# Patient Record
Sex: MALE | Race: WHITE | NOT HISPANIC OR LATINO
[De-identification: names, ages, dates, MRNs, and addresses within clinical notes are randomized per-mention and may not be internally consistent; named-entity substitution may affect disease eponyms.]

---

## 2017-12-27 PROBLEM — Z00.00 ENCOUNTER FOR PREVENTIVE HEALTH EXAMINATION: Status: ACTIVE | Noted: 2017-12-27

## 2018-01-26 ENCOUNTER — APPOINTMENT (OUTPATIENT)
Dept: NEUROLOGY | Facility: CLINIC | Age: 63
End: 2018-01-26
Payer: COMMERCIAL

## 2018-01-26 VITALS
SYSTOLIC BLOOD PRESSURE: 120 MMHG | DIASTOLIC BLOOD PRESSURE: 82 MMHG | WEIGHT: 200 LBS | HEIGHT: 70 IN | BODY MASS INDEX: 28.63 KG/M2

## 2018-01-26 DIAGNOSIS — E78.5 HYPERLIPIDEMIA, UNSPECIFIED: ICD-10-CM

## 2018-01-26 DIAGNOSIS — Z78.9 OTHER SPECIFIED HEALTH STATUS: ICD-10-CM

## 2018-01-26 PROCEDURE — 99213 OFFICE O/P EST LOW 20 MIN: CPT

## 2018-09-14 ENCOUNTER — EMERGENCY (EMERGENCY)
Facility: HOSPITAL | Age: 63
LOS: 1 days | Discharge: HOME | End: 2018-09-14
Attending: EMERGENCY MEDICINE
Payer: COMMERCIAL

## 2018-09-14 VITALS
SYSTOLIC BLOOD PRESSURE: 132 MMHG | TEMPERATURE: 98 F | OXYGEN SATURATION: 98 % | RESPIRATION RATE: 16 BRPM | DIASTOLIC BLOOD PRESSURE: 65 MMHG | HEART RATE: 87 BPM

## 2018-09-14 VITALS — HEIGHT: 67 IN | WEIGHT: 199.96 LBS

## 2018-09-14 LAB
ALBUMIN SERPL ELPH-MCNC: 4.1 G/DL — SIGNIFICANT CHANGE UP (ref 3.3–5.2)
ALP SERPL-CCNC: 52 U/L — SIGNIFICANT CHANGE UP (ref 40–120)
ALT FLD-CCNC: 21 U/L — SIGNIFICANT CHANGE UP
ANION GAP SERPL CALC-SCNC: 12 MMOL/L — SIGNIFICANT CHANGE UP (ref 5–17)
APPEARANCE UR: CLEAR — SIGNIFICANT CHANGE UP
AST SERPL-CCNC: 31 U/L — SIGNIFICANT CHANGE UP
BACTERIA # UR AUTO: ABNORMAL
BASOPHILS NFR BLD AUTO: 1 % — SIGNIFICANT CHANGE UP (ref 0–2)
BILIRUB SERPL-MCNC: 0.4 MG/DL — SIGNIFICANT CHANGE UP (ref 0.4–2)
BILIRUB UR-MCNC: NEGATIVE — SIGNIFICANT CHANGE UP
BUN SERPL-MCNC: 11 MG/DL — SIGNIFICANT CHANGE UP (ref 8–20)
CALCIUM SERPL-MCNC: 8.8 MG/DL — SIGNIFICANT CHANGE UP (ref 8.6–10.2)
CHLORIDE SERPL-SCNC: 102 MMOL/L — SIGNIFICANT CHANGE UP (ref 98–107)
CO2 SERPL-SCNC: 25 MMOL/L — SIGNIFICANT CHANGE UP (ref 22–29)
COLOR SPEC: YELLOW — SIGNIFICANT CHANGE UP
CREAT SERPL-MCNC: 0.73 MG/DL — SIGNIFICANT CHANGE UP (ref 0.5–1.3)
DIFF PNL FLD: ABNORMAL
EOSINOPHIL NFR BLD AUTO: 1 % — SIGNIFICANT CHANGE UP (ref 0–6)
EPI CELLS # UR: SIGNIFICANT CHANGE UP
GLUCOSE SERPL-MCNC: 135 MG/DL — HIGH (ref 70–115)
GLUCOSE UR QL: NEGATIVE MG/DL — SIGNIFICANT CHANGE UP
HCT VFR BLD CALC: 47.4 % — SIGNIFICANT CHANGE UP (ref 42–52)
HGB BLD-MCNC: 15.5 G/DL — SIGNIFICANT CHANGE UP (ref 14–18)
KETONES UR-MCNC: NEGATIVE — SIGNIFICANT CHANGE UP
LACTATE BLDV-MCNC: 1 MMOL/L — SIGNIFICANT CHANGE UP (ref 0.5–2)
LEUKOCYTE ESTERASE UR-ACNC: NEGATIVE — SIGNIFICANT CHANGE UP
LYMPHOCYTES # BLD AUTO: 12 % — LOW (ref 20–55)
MCHC RBC-ENTMCNC: 31.4 PG — HIGH (ref 27–31)
MCHC RBC-ENTMCNC: 32.7 G/DL — SIGNIFICANT CHANGE UP (ref 32–36)
MCV RBC AUTO: 96.1 FL — HIGH (ref 80–94)
MONOCYTES NFR BLD AUTO: 14 % — HIGH (ref 3–10)
NEUTROPHILS NFR BLD AUTO: 67 % — SIGNIFICANT CHANGE UP (ref 37–73)
NEUTS BAND # BLD: 4 % — SIGNIFICANT CHANGE UP (ref 0–8)
NITRITE UR-MCNC: NEGATIVE — SIGNIFICANT CHANGE UP
PH UR: 6 — SIGNIFICANT CHANGE UP (ref 5–8)
PLAT MORPH BLD: NORMAL — SIGNIFICANT CHANGE UP
PLATELET # BLD AUTO: 241 K/UL — SIGNIFICANT CHANGE UP (ref 150–400)
POTASSIUM SERPL-MCNC: 3.8 MMOL/L — SIGNIFICANT CHANGE UP (ref 3.5–5.3)
POTASSIUM SERPL-SCNC: 3.8 MMOL/L — SIGNIFICANT CHANGE UP (ref 3.5–5.3)
PROT SERPL-MCNC: 6.8 G/DL — SIGNIFICANT CHANGE UP (ref 6.6–8.7)
PROT UR-MCNC: 15 MG/DL
RAPID RVP RESULT: SIGNIFICANT CHANGE UP
RBC # BLD: 4.93 M/UL — SIGNIFICANT CHANGE UP (ref 4.6–6.2)
RBC # FLD: 13.1 % — SIGNIFICANT CHANGE UP (ref 11–15.6)
RBC BLD AUTO: NORMAL — SIGNIFICANT CHANGE UP
RBC CASTS # UR COMP ASSIST: ABNORMAL /HPF (ref 0–4)
SODIUM SERPL-SCNC: 139 MMOL/L — SIGNIFICANT CHANGE UP (ref 135–145)
SP GR SPEC: 1.01 — SIGNIFICANT CHANGE UP (ref 1.01–1.02)
UROBILINOGEN FLD QL: NEGATIVE MG/DL — SIGNIFICANT CHANGE UP
VARIANT LYMPHS # BLD: 1 % — SIGNIFICANT CHANGE UP (ref 0–6)
WBC # BLD: 6.4 K/UL — SIGNIFICANT CHANGE UP (ref 4.8–10.8)
WBC # FLD AUTO: 6.4 K/UL — SIGNIFICANT CHANGE UP (ref 4.8–10.8)
WBC UR QL: SIGNIFICANT CHANGE UP

## 2018-09-14 PROCEDURE — 36415 COLL VENOUS BLD VENIPUNCTURE: CPT

## 2018-09-14 PROCEDURE — 83605 ASSAY OF LACTIC ACID: CPT

## 2018-09-14 PROCEDURE — 71046 X-RAY EXAM CHEST 2 VIEWS: CPT

## 2018-09-14 PROCEDURE — 71046 X-RAY EXAM CHEST 2 VIEWS: CPT | Mod: 26,77

## 2018-09-14 PROCEDURE — 96375 TX/PRO/DX INJ NEW DRUG ADDON: CPT

## 2018-09-14 PROCEDURE — 87581 M.PNEUMON DNA AMP PROBE: CPT

## 2018-09-14 PROCEDURE — 81001 URINALYSIS AUTO W/SCOPE: CPT

## 2018-09-14 PROCEDURE — 87633 RESP VIRUS 12-25 TARGETS: CPT

## 2018-09-14 PROCEDURE — 99284 EMERGENCY DEPT VISIT MOD MDM: CPT | Mod: 25

## 2018-09-14 PROCEDURE — 87798 DETECT AGENT NOS DNA AMP: CPT

## 2018-09-14 PROCEDURE — 96374 THER/PROPH/DIAG INJ IV PUSH: CPT

## 2018-09-14 PROCEDURE — 87086 URINE CULTURE/COLONY COUNT: CPT

## 2018-09-14 PROCEDURE — 99284 EMERGENCY DEPT VISIT MOD MDM: CPT

## 2018-09-14 PROCEDURE — 85027 COMPLETE CBC AUTOMATED: CPT

## 2018-09-14 PROCEDURE — 87040 BLOOD CULTURE FOR BACTERIA: CPT

## 2018-09-14 PROCEDURE — 87486 CHLMYD PNEUM DNA AMP PROBE: CPT

## 2018-09-14 PROCEDURE — 80053 COMPREHEN METABOLIC PANEL: CPT

## 2018-09-14 RX ORDER — VALACYCLOVIR 500 MG/1
1 TABLET, FILM COATED ORAL
Qty: 0 | Refills: 0 | DISCHARGE
Start: 2018-09-14 | End: 2018-09-23

## 2018-09-14 RX ORDER — AZITHROMYCIN 500 MG/1
500 TABLET, FILM COATED ORAL ONCE
Qty: 0 | Refills: 0 | Status: COMPLETED | OUTPATIENT
Start: 2018-09-14 | End: 2018-09-14

## 2018-09-14 RX ORDER — AZITHROMYCIN 500 MG/1
1 TABLET, FILM COATED ORAL
Qty: 4 | Refills: 0
Start: 2018-09-14 | End: 2018-09-17

## 2018-09-14 RX ORDER — CEFTRIAXONE 500 MG/1
1 INJECTION, POWDER, FOR SOLUTION INTRAMUSCULAR; INTRAVENOUS ONCE
Qty: 0 | Refills: 0 | Status: COMPLETED | OUTPATIENT
Start: 2018-09-14 | End: 2018-09-14

## 2018-09-14 RX ORDER — SODIUM CHLORIDE 9 MG/ML
1000 INJECTION INTRAMUSCULAR; INTRAVENOUS; SUBCUTANEOUS ONCE
Qty: 0 | Refills: 0 | Status: COMPLETED | OUTPATIENT
Start: 2018-09-14 | End: 2018-09-14

## 2018-09-14 RX ORDER — ACETAMINOPHEN 500 MG
650 TABLET ORAL ONCE
Qty: 0 | Refills: 0 | Status: COMPLETED | OUTPATIENT
Start: 2018-09-14 | End: 2018-09-14

## 2018-09-14 RX ADMIN — CEFTRIAXONE 100 GRAM(S): 500 INJECTION, POWDER, FOR SOLUTION INTRAMUSCULAR; INTRAVENOUS at 18:38

## 2018-09-14 RX ADMIN — SODIUM CHLORIDE 2000 MILLILITER(S): 9 INJECTION INTRAMUSCULAR; INTRAVENOUS; SUBCUTANEOUS at 18:36

## 2018-09-14 RX ADMIN — Medication 650 MILLIGRAM(S): at 19:08

## 2018-09-14 RX ADMIN — Medication 650 MILLIGRAM(S): at 18:40

## 2018-09-14 RX ADMIN — AZITHROMYCIN 255 MILLIGRAM(S): 500 TABLET, FILM COATED ORAL at 19:08

## 2018-09-14 NOTE — ED PROVIDER NOTE - OBJECTIVE STATEMENT
Patient is a 63 y/o male who states that he was sent to the ED by his dermatologist. He first noticed a bilateral red papular rash on his legs, belly and groin that has increased since last sunday. The lesions are non painful slightly itchy and slightly "burning" in sensation (5 days ago)last night stated he had a fever to 102.3. Today he states he was seen by his dermatologist who took a biopsy of one of the lesions and also received blood work and an xray. He was later called and told his xray had an abnormal result and to go to the ED. He denies any nausea, vomiting, SOB, exertional dyspnea, productive cough, chest pain, dysuria. He did have some exposure to the wilderness recently and works as a highschool teacher. No known sick contacts or international travel Patient is a 61 y/o male who states that he was sent to the ED by his dermatologist. He first noticed a bilateral red papular rash on his legs, belly and groin that has increased since last sunday. The lesions are non painful slightly itchy and slightly "burning" in sensation (5 days ago)last night stated he had a fever to 102.3. Today he states he was seen by his dermatologist who took a biopsy of one of the lesions and also received blood work and an xray. He was later called and told his xray had an abnormal result and to go to the ED. Pt's dermatologist was concerned about possible primary varicella with concomitant pneumonia.  He denies any nausea, vomiting, SOB, exertional dyspnea, productive cough, chest pain, dysuria. He did have some exposure to the wilderness recently and works as a . No known sick contacts or international travel

## 2018-09-14 NOTE — ED PROVIDER NOTE - PROGRESS NOTE DETAILS
Pt. stable appearing. NO SOB. NO chest pain. NO coughing. O2 95-96% on RA. x-ray reviewed with night radiologist. She stated that she did not appreciate any pneumonia. Awaiting on RVP. Pt. will be discharged home on PO ABX and Pt. may continue to take his Acyclovir. Pt. with negative. RVP. Pt. stable for discharge. Pt. informed that he should follow up with his PMD and or dermatologist. Pt. is to return to ER if persistent fever or difficulty breathing.

## 2018-09-14 NOTE — ED ADULT TRIAGE NOTE - CHIEF COMPLAINT QUOTE
sent from [facility]md for r/o chicken pox, as per chief of cdc , pt has vesicles, and wants fluids from lesions kept on ice and sent to [facility] lab for eval. , as per cdc pt needs a resp. viral panel

## 2018-09-14 NOTE — ED ADULT NURSE NOTE - OBJECTIVE STATEMENT
Assumed care at 1832.  A+OX4, code sepsis initiated from [facility] for temp of 101.3 and o2 sat 94%.  Generalized rash through out body, denies itching started on Sunday. Denies recent travel. Sent here from pmd. Assumed care at 1832.  A+OX4, code sepsis initiated from [facility] for temp of 101.3 and HR 91.  Generalized rash through out body, denies itching started on Sunday. Denies recent travel. Sent here from pmd.

## 2018-09-14 NOTE — ED ADULT NURSE NOTE - CHIEF COMPLAINT QUOTE
sent from [facility] for r/o chicken pox, as per chief of cdc , pt has vesicles, and wants fluids from lesions kept on ice and sent to [facility] lab for eval. , as per cdc pt needs a resp. viral panel

## 2018-09-14 NOTE — ED PROVIDER NOTE - SKIN LOCATION #1
1-2 mm raised red papular lesions, no crusting, no fluid, no vesicles/back/groin/abdominal region/leg

## 2018-09-14 NOTE — ED ADULT NURSE NOTE - NSIMPLEMENTINTERV_GEN_ALL_ED
Implemented All Universal Safety Interventions:  [place] to call system. Call bell, personal items and telephone within reach. Instruct patient to call for assistance. Room bathroom lighting operational. Non-slip footwear when patient is off stretcher. Physically safe environment: no spills, clutter or unnecessary equipment. Stretcher in lowest position, wheels locked, appropriate side rails in place.

## 2018-09-15 LAB
CULTURE RESULTS: NO GROWTH — SIGNIFICANT CHANGE UP
SPECIMEN SOURCE: SIGNIFICANT CHANGE UP

## 2018-09-15 RX ORDER — ROSUVASTATIN CALCIUM 5 MG/1
1 TABLET ORAL
Qty: 0 | Refills: 0 | COMMUNITY

## 2018-09-19 LAB
CULTURE RESULTS: SIGNIFICANT CHANGE UP
CULTURE RESULTS: SIGNIFICANT CHANGE UP
SPECIMEN SOURCE: SIGNIFICANT CHANGE UP
SPECIMEN SOURCE: SIGNIFICANT CHANGE UP

## 2019-01-29 ENCOUNTER — APPOINTMENT (OUTPATIENT)
Dept: NEUROLOGY | Facility: CLINIC | Age: 64
End: 2019-01-29
Payer: COMMERCIAL

## 2019-01-29 VITALS
HEIGHT: 70 IN | WEIGHT: 200 LBS | DIASTOLIC BLOOD PRESSURE: 70 MMHG | SYSTOLIC BLOOD PRESSURE: 130 MMHG | BODY MASS INDEX: 28.63 KG/M2

## 2019-01-29 DIAGNOSIS — G31.84 MILD COGNITIVE IMPAIRMENT, SO STATED: ICD-10-CM

## 2019-01-29 PROCEDURE — 99214 OFFICE O/P EST MOD 30 MIN: CPT

## 2019-01-29 NOTE — CONSULT LETTER
[Dear  ___] : Dear  [unfilled], [Courtesy Letter:] : I had the pleasure of seeing your patient, [unfilled], in my office today. [Please see my note below.] : Please see my note below. [Consult Closing:] : Thank you very much for allowing me to participate in the care of this patient.  If you have any questions, please do not hesitate to contact me. [Sincerely,] : Sincerely, [FreeTextEntry3] : Tee Danielson M.D., Ph.D. DPN-N\par [facility]\par Neurology at [place]\par Medical Director of Stroke Services\par [facility]\par

## 2019-01-29 NOTE — REASON FOR VISIT
[Follow-Up: _____] : a [unfilled] follow-up visit [FreeTextEntry1] : new c/o memory probolems and left leg numbness

## 2019-01-29 NOTE — HISTORY OF PRESENT ILLNESS
[FreeTextEntry1] : Followup January 26, 2018:\par This is a 62-year-old man who follows up today for history of seizure. In the past we have tapered his Keppra. He had been doing well but had one event this questionable seizure. We repeated an EEG which was normal. He states that he had another event in July but this was in the setting of extreme stress oriented by 3 hours of confusion. His life of late has been extremely stressful for both personal and work-related reasons. He seems to be dealing with the stress fairly well. Occasionally he may have some confusion from being overwhelmed. He is here today for neurologic followup evaluation.\par \par Followup January 29, 2019:\par This is a 63-year-old man who returns today for followup of seizures as well as anxiety. He additionally has new complaints of memory loss and left leg numbness. Regarding his seizures she's been seizure free for over a year and a half now he's been off his Keppra. His last seizure may not have been epileptic and that is in the setting of a tremendous amount of stress. Regarding his anxiety he does have anxiety of hearing levels. He does see a  for this. His ulcer reported memory problems and absent mindedness at times. Which may be due to his anxiety but it's not clear. He is concerned about the diagnosis of Alzheimer's type dementia. He's also been experiencing numbness down the lateral portion of the left leg that starts in the hip and radiates down to the foot. He'll occasionally also have pain in the Achilles tendon upon stretching of the foot. He is here today for neurologic evaluation of these issues.

## 2019-01-29 NOTE — ASSESSMENT
[FreeTextEntry1] : This is a gentleman with previous history of seizures currently stable off medication. He has anxiety for which she sees a  however it seems that his anxiety has increased. I did make a suggestion that he consider seeing psychiatrist if he needs medication. Regarding his memory problems I think they're likely due to his anxiety but just to confirm that is not do to more of a degenerative process I will send her for neuropsychologic testing. He may be able to continue treatment with the psychologist which may help his anxiety as well. Regarding the numbness in the leg that sounds like a radiculopathy and to confirm this I will do an EMG and nerve conduction study of his left lower extremity. I will see him back in the office in 2 months or call him once again the results of his testing.

## 2019-01-29 NOTE — PHYSICAL EXAM
[Person] : oriented to person [Place] : oriented to place [Time] : oriented to time [Remote Intact] : remote memory intact [Registration Intact] : recent registration memory intact [Span Intact] : the attention span was normal [Concentration Intact] : normal concentrating ability [Visual Intact] : visual attention was ~T not ~L decreased [Naming Objects] : no difficulty naming common objects [Repeating Phrases] : no difficulty repeating a phrase [Fluency] : fluency intact [Comprehension] : comprehension intact [Current Events] : adequate knowledge of current events [Past History] : adequate knowledge of personal past history [Cranial Nerves Optic (II)] : visual acuity intact bilaterally,  visual fields full to confrontation, pupils equal round and reactive to light [Cranial Nerves Oculomotor (III)] : extraocular motion intact [Cranial Nerves Trigeminal (V)] : facial sensation intact symmetrically [Cranial Nerves Facial (VII)] : face symmetrical [Cranial Nerves Vestibulocochlear (VIII)] : hearing was intact bilaterally [Cranial Nerves Glossopharyngeal (IX)] : tongue and palate midline [Cranial Nerves Accessory (XI - Cranial And Spinal)] : head turning and shoulder shrug symmetric [Cranial Nerves Hypoglossal (XII)] : there was no tongue deviation with protrusion [Motor Tone] : muscle tone was normal in all four extremities [Motor Strength] : muscle strength was normal in all four extremities [Involuntary Movements] : no involuntary movements were seen [No Muscle Atrophy] : normal bulk in all four extremities [Paresis Pronator Drift Right-Sided] : no pronator drift on the right [Paresis Pronator Drift Left-Sided] : no pronator drift on the left [Sensation Tactile Decrease] : light touch was intact [Sensation Pain / Temperature Decrease] : pain and temperature was intact [Sensation Vibration Decrease] : vibration was intact [Proprioception] : proprioception was intact [Abnormal Walk] : normal gait [Balance] : balance was intact [Tremor] : no tremor present [Coordination - Dysmetria Impaired Finger-to-Nose Bilateral] : not present [2+] : Patella right 2+ [1+] : Patella left 1+ [Sclera] : the sclera and conjunctiva were normal [PERRL With Normal Accommodation] : pupils were equal in size, round, reactive to light, with normal accommodation [Extraocular Movements] : extraocular movements were intact [No APD] : no afferent pupillary defect [No MILENA] : no internuclear ophthalmoplegia [Full Visual Field] : full visual field

## 2019-01-29 NOTE — REVIEW OF SYSTEMS
[Anxiety] : anxiety [As Noted in HPI] : as noted in HPI [Confused or Disoriented] : confusion [Memory Lapses or Loss] : memory loss [Decr. Concentrating Ability] : decreased concentrating ability [Numbness] : numbness [Negative] : Heme/Lymph

## 2019-02-15 ENCOUNTER — TRANSCRIPTION ENCOUNTER (OUTPATIENT)
Age: 64
End: 2019-02-15

## 2019-03-07 ENCOUNTER — APPOINTMENT (OUTPATIENT)
Dept: NEUROLOGY | Facility: CLINIC | Age: 64
End: 2019-03-07
Payer: COMMERCIAL

## 2019-03-07 PROCEDURE — 95910 NRV CNDJ TEST 7-8 STUDIES: CPT

## 2019-03-07 PROCEDURE — 95886 MUSC TEST DONE W/N TEST COMP: CPT

## 2019-04-09 ENCOUNTER — APPOINTMENT (OUTPATIENT)
Dept: NEUROLOGY | Facility: CLINIC | Age: 64
End: 2019-04-09

## 2020-03-19 ENCOUNTER — APPOINTMENT (OUTPATIENT)
Dept: DERMATOLOGY | Facility: CLINIC | Age: 65
End: 2020-03-19
Payer: COMMERCIAL

## 2020-03-19 PROCEDURE — 99202 OFFICE O/P NEW SF 15 MIN: CPT

## 2020-08-27 ENCOUNTER — APPOINTMENT (OUTPATIENT)
Dept: ORTHOPEDIC SURGERY | Facility: CLINIC | Age: 65
End: 2020-08-27
Payer: COMMERCIAL

## 2020-08-27 VITALS
DIASTOLIC BLOOD PRESSURE: 75 MMHG | HEART RATE: 71 BPM | BODY MASS INDEX: 28.63 KG/M2 | WEIGHT: 200 LBS | SYSTOLIC BLOOD PRESSURE: 133 MMHG | HEIGHT: 70 IN

## 2020-08-27 PROCEDURE — 99203 OFFICE O/P NEW LOW 30 MIN: CPT

## 2020-08-27 PROCEDURE — 73560 X-RAY EXAM OF KNEE 1 OR 2: CPT | Mod: LT

## 2020-08-31 NOTE — ADDENDUM
[FreeTextEntry1] : Dictated by Kady Murrieta, OTR/L, PA \par \par This note was written by Zuleyka Stahl on 08/30/2020 acting as scribe for You Baker III, MD

## 2020-08-31 NOTE — DISCUSSION/SUMMARY
[de-identified] : The patient is advised to put a neoprene sleeve onto the left knee for the left knee bursitis.  He was placed into an Ace bandage today for compression.  He is advised to continue to use anti-inflammatories and ice it consistently.  If it gets red, he is to make sure he calls the office right away.  He will return back to the office in one week to make sure it is getting much better.

## 2020-08-31 NOTE — HISTORY OF PRESENT ILLNESS
[de-identified] : The patient comes in today stating that he was away and he banged his left knee, as well as kneeling on it, and then he fell on it.  He now has a swollen knee below his kneecap.  He states that it is very swollen.  He does not have any major pain today.  The patient states the onset/injury occurred on 20.  This injury is not work related or due to an automobile accident.  The patient states the pain is constant.  The patient describes the pain as dull and burning.  The patient states rest, water therapy and ibuprofen makes the pain better while walking and standing makes the pain worse.\par \par Pain level includes a current pain level of 3/10.\par \par Ailment Interference:  \par Daily Livin/10\par Normal Work:  5/10\par Sleep:  4/10\par Enjoyment of Life:  4/10\par Climbing Stairs:  8/10\par Extra-Curricular Activities:  5/10

## 2020-08-31 NOTE — REVIEW OF SYSTEMS
[Negative] : Heme/Lymph [FreeTextEntry3] : Redness [FreeTextEntry9] : Joint stiffness; joint swelling [de-identified] : Anxiety; depression; sleep disturbances

## 2020-09-02 ENCOUNTER — APPOINTMENT (OUTPATIENT)
Dept: ORTHOPEDIC SURGERY | Facility: CLINIC | Age: 65
End: 2020-09-02
Payer: COMMERCIAL

## 2020-09-02 VITALS
WEIGHT: 200 LBS | HEIGHT: 70 IN | DIASTOLIC BLOOD PRESSURE: 69 MMHG | BODY MASS INDEX: 28.63 KG/M2 | SYSTOLIC BLOOD PRESSURE: 102 MMHG | HEART RATE: 65 BPM

## 2020-09-02 PROCEDURE — 99213 OFFICE O/P EST LOW 20 MIN: CPT

## 2020-09-04 NOTE — PHYSICAL EXAM
[de-identified] : Ambulating with a slightly antalgic to antalgic gait.  Station:  Normal.  [de-identified] : General Appearance:  Well-developed, well-nourished male in no acute distress.  [de-identified] : Left Knee: \par Range of motion is within normal limits. \par

## 2020-09-04 NOTE — HISTORY OF PRESENT ILLNESS
[de-identified] : The patient comes in today for the left knee.  The patient is still having some redness and swelling to the area.  There is no mechanical issues.  He states that he is feeling a little bit tired.  His hip is also hurting.

## 2020-09-04 NOTE — DISCUSSION/SUMMARY
[de-identified] : I am ordering some Lyme titers and some general blood work due to the left knee swelling, knee joint effusion and bursitis.  He is advised to follow up with his primary care physician today or tomorrow.  We will do a telephonics with him in two weeks.

## 2020-09-04 NOTE — ADDENDUM
[FreeTextEntry1] : This note was written by Zuleyka Stahl on 09/04/2020 acting as scribe for Kady Murrieta, CHIOMA/L, PA

## 2020-09-16 ENCOUNTER — APPOINTMENT (OUTPATIENT)
Dept: ORTHOPEDIC SURGERY | Facility: CLINIC | Age: 65
End: 2020-09-16
Payer: COMMERCIAL

## 2020-09-16 DIAGNOSIS — M25.469 EFFUSION, UNSPECIFIED KNEE: ICD-10-CM

## 2020-09-16 DIAGNOSIS — M70.52 OTHER BURSITIS OF KNEE, LEFT KNEE: ICD-10-CM

## 2020-09-16 PROCEDURE — 99441: CPT

## 2020-10-06 ENCOUNTER — TRANSCRIPTION ENCOUNTER (OUTPATIENT)
Age: 65
End: 2020-10-06

## 2020-10-16 ENCOUNTER — APPOINTMENT (OUTPATIENT)
Dept: ORTHOPEDIC SURGERY | Facility: CLINIC | Age: 65
End: 2020-10-16
Payer: COMMERCIAL

## 2020-10-16 VITALS
DIASTOLIC BLOOD PRESSURE: 77 MMHG | HEART RATE: 80 BPM | SYSTOLIC BLOOD PRESSURE: 130 MMHG | WEIGHT: 200 LBS | BODY MASS INDEX: 28.63 KG/M2 | HEIGHT: 70 IN

## 2020-10-16 DIAGNOSIS — Z86.39 PERSONAL HISTORY OF OTHER ENDOCRINE, NUTRITIONAL AND METABOLIC DISEASE: ICD-10-CM

## 2020-10-16 PROCEDURE — 99214 OFFICE O/P EST MOD 30 MIN: CPT

## 2020-10-16 PROCEDURE — 72100 X-RAY EXAM L-S SPINE 2/3 VWS: CPT

## 2020-10-16 NOTE — ADDENDUM
[FreeTextEntry1] : Documented by Erik Feliciano acting as a scribe for Dr. Reggie Henning on 10/16/2020. All medical record entries made by the Scribe were at my, Dr. Reggie Heninng, direction and personally dictated by me on 10/16/2020 . I have reviewed the chart and agree that the record accurately reflects my personal performance of the history, physical exam, assessment and plan. I have also personally directed, reviewed, and agreed with the chart.

## 2020-10-16 NOTE — PHYSICAL EXAM
[Poor Appearance] : well-appearing [Acute Distress] : not in acute distress [de-identified] : CONSTITUTIONAL: The patient is a very pleasant individual who is well-nourished and who appears stated age.\par PSYCHIATRIC: The patient is alert and oriented X 3 and in no apparent distress, and participates with orthopedic evaluation well.\par HEAD: Atraumatic and is nonsyndromic in appearance.\par EENT: No visible thyromegaly, EOMI.\par RESPIRATORY: Respiratory rate is regular, not dyspneic on examination.\par LYMPHATICS: There is no inguinal lymphadenopathy\par INTEGUMENTARY: Skin is clean, dry, and intact about the bilateral lower extremities and lumbar spine.\par VASCULAR: There is brisk capillary refill about the bilateral lower extremities.\par NEUROLOGIC: There are no pathologic reflexes. There is no decrease in sensation of the bilateral lower extremities on Wartenberg pinwheel examination. Deep tendon reflexes are well maintained at 2+/4 of the bilateral lower extremities and are symmetric.\par MUSCULOSKELETAL: There is no visible muscular atrophy. Manual motor strength is well maintained in the bilateral lower extremities. Range of motion of lumbar spine is well maintained. The patient ambulates in a non-myelopathic manner. Negative tension sign and straight leg raise bilaterally. Quad extension, ankle dorsiflexion, EHL, plantar flexion, and ankle eversion are well preserved. Normal secondary orthopaedic exam of bilateral hips, greater trochanteric area, knees and ankles \par \par Relatively asymptomatic at this time, mild mechanically oriented lower back pain, no radiculopathy complaints.  [Obese] : not obese [de-identified] : Xray of the lumbar spine taken 10/16/2020 demonstrates degenerative Rotoscoliosis and disc space asymmetry at L4 and L5.

## 2020-10-16 NOTE — HISTORY OF PRESENT ILLNESS
[Ataxia] : no ataxia [de-identified] : 64 year old  M Presents for evaluation of chronic intermittent back pain that is at a 1/10 currently. The episodes are spasm like which I think may be consistent with a anular tear.  In 08/2020 he got what he calls a back seizure that causes him to be unable to function. He describes the pain as burning and electric in feeling that was so intense he can only walk roughly thirty feet at a time. He went to an urgent care and got a muscle relaxant, the pain subsided two days later.  He hasn’t had one in ten years. No radiculopathy complaints.  [Incontinence] : no incontinence [Loss of Dexterity] : good dexterity [Urinary Ret.] : no urinary retention

## 2020-10-22 ENCOUNTER — EMERGENCY (EMERGENCY)
Facility: HOSPITAL | Age: 65
LOS: 1 days | Discharge: HOME | End: 2020-10-22
Attending: EMERGENCY MEDICINE
Payer: COMMERCIAL

## 2020-10-22 VITALS
HEART RATE: 75 BPM | RESPIRATION RATE: 18 BRPM | OXYGEN SATURATION: 96 % | DIASTOLIC BLOOD PRESSURE: 74 MMHG | TEMPERATURE: 98 F | SYSTOLIC BLOOD PRESSURE: 136 MMHG

## 2020-10-22 VITALS
DIASTOLIC BLOOD PRESSURE: 73 MMHG | HEART RATE: 82 BPM | SYSTOLIC BLOOD PRESSURE: 136 MMHG | WEIGHT: 199.96 LBS | TEMPERATURE: 100 F | RESPIRATION RATE: 18 BRPM | HEIGHT: 70 IN | OXYGEN SATURATION: 96 %

## 2020-10-22 LAB
ALBUMIN SERPL ELPH-MCNC: 4.5 G/DL — SIGNIFICANT CHANGE UP (ref 3.3–5.2)
ALP SERPL-CCNC: 63 U/L — SIGNIFICANT CHANGE UP (ref 40–120)
ALT FLD-CCNC: 20 U/L — SIGNIFICANT CHANGE UP
ANION GAP SERPL CALC-SCNC: 14 MMOL/L — SIGNIFICANT CHANGE UP (ref 5–17)
APTT BLD: 30.1 SEC — SIGNIFICANT CHANGE UP (ref 27.5–35.5)
AST SERPL-CCNC: 21 U/L — SIGNIFICANT CHANGE UP
BASOPHILS # BLD AUTO: 0.06 K/UL — SIGNIFICANT CHANGE UP (ref 0–0.2)
BASOPHILS NFR BLD AUTO: 0.4 % — SIGNIFICANT CHANGE UP (ref 0–2)
BILIRUB SERPL-MCNC: 0.5 MG/DL — SIGNIFICANT CHANGE UP (ref 0.4–2)
BUN SERPL-MCNC: 10 MG/DL — SIGNIFICANT CHANGE UP (ref 8–20)
CALCIUM SERPL-MCNC: 9.3 MG/DL — SIGNIFICANT CHANGE UP (ref 8.6–10.2)
CHLORIDE SERPL-SCNC: 98 MMOL/L — SIGNIFICANT CHANGE UP (ref 98–107)
CO2 SERPL-SCNC: 26 MMOL/L — SIGNIFICANT CHANGE UP (ref 22–29)
CREAT SERPL-MCNC: 0.66 MG/DL — SIGNIFICANT CHANGE UP (ref 0.5–1.3)
EOSINOPHIL # BLD AUTO: 0.01 K/UL — SIGNIFICANT CHANGE UP (ref 0–0.5)
EOSINOPHIL NFR BLD AUTO: 0.1 % — SIGNIFICANT CHANGE UP (ref 0–6)
GLUCOSE SERPL-MCNC: 159 MG/DL — HIGH (ref 70–99)
HCT VFR BLD CALC: 48.7 % — SIGNIFICANT CHANGE UP (ref 39–50)
HGB BLD-MCNC: 16.3 G/DL — SIGNIFICANT CHANGE UP (ref 13–17)
IMM GRANULOCYTES NFR BLD AUTO: 0.7 % — SIGNIFICANT CHANGE UP (ref 0–1.5)
INR BLD: 1.04 RATIO — SIGNIFICANT CHANGE UP (ref 0.88–1.16)
LYMPHOCYTES # BLD AUTO: 1.86 K/UL — SIGNIFICANT CHANGE UP (ref 1–3.3)
LYMPHOCYTES # BLD AUTO: 13.7 % — SIGNIFICANT CHANGE UP (ref 13–44)
MCHC RBC-ENTMCNC: 31.8 PG — SIGNIFICANT CHANGE UP (ref 27–34)
MCHC RBC-ENTMCNC: 33.5 GM/DL — SIGNIFICANT CHANGE UP (ref 32–36)
MCV RBC AUTO: 95.1 FL — SIGNIFICANT CHANGE UP (ref 80–100)
MONOCYTES # BLD AUTO: 1.03 K/UL — HIGH (ref 0–0.9)
MONOCYTES NFR BLD AUTO: 7.6 % — SIGNIFICANT CHANGE UP (ref 2–14)
NEUTROPHILS # BLD AUTO: 10.49 K/UL — HIGH (ref 1.8–7.4)
NEUTROPHILS NFR BLD AUTO: 77.5 % — HIGH (ref 43–77)
PLATELET # BLD AUTO: 345 K/UL — SIGNIFICANT CHANGE UP (ref 150–400)
POTASSIUM SERPL-MCNC: 3.8 MMOL/L — SIGNIFICANT CHANGE UP (ref 3.5–5.3)
POTASSIUM SERPL-SCNC: 3.8 MMOL/L — SIGNIFICANT CHANGE UP (ref 3.5–5.3)
PROT SERPL-MCNC: 7 G/DL — SIGNIFICANT CHANGE UP (ref 6.6–8.7)
PROTHROM AB SERPL-ACNC: 12 SEC — SIGNIFICANT CHANGE UP (ref 10.6–13.6)
RBC # BLD: 5.12 M/UL — SIGNIFICANT CHANGE UP (ref 4.2–5.8)
RBC # FLD: 12.2 % — SIGNIFICANT CHANGE UP (ref 10.3–14.5)
SODIUM SERPL-SCNC: 138 MMOL/L — SIGNIFICANT CHANGE UP (ref 135–145)
TROPONIN T SERPL-MCNC: <0.01 NG/ML — SIGNIFICANT CHANGE UP (ref 0–0.06)
WBC # BLD: 13.55 K/UL — HIGH (ref 3.8–10.5)
WBC # FLD AUTO: 13.55 K/UL — HIGH (ref 3.8–10.5)

## 2020-10-22 PROCEDURE — 0042T: CPT

## 2020-10-22 PROCEDURE — 99220: CPT

## 2020-10-22 PROCEDURE — 85730 THROMBOPLASTIN TIME PARTIAL: CPT

## 2020-10-22 PROCEDURE — 70551 MRI BRAIN STEM W/O DYE: CPT

## 2020-10-22 PROCEDURE — 93005 ELECTROCARDIOGRAM TRACING: CPT

## 2020-10-22 PROCEDURE — 82962 GLUCOSE BLOOD TEST: CPT

## 2020-10-22 PROCEDURE — 36415 COLL VENOUS BLD VENIPUNCTURE: CPT

## 2020-10-22 PROCEDURE — 70498 CT ANGIOGRAPHY NECK: CPT | Mod: 26

## 2020-10-22 PROCEDURE — 84484 ASSAY OF TROPONIN QUANT: CPT

## 2020-10-22 PROCEDURE — 70496 CT ANGIOGRAPHY HEAD: CPT

## 2020-10-22 PROCEDURE — 80053 COMPREHEN METABOLIC PANEL: CPT

## 2020-10-22 PROCEDURE — 70551 MRI BRAIN STEM W/O DYE: CPT | Mod: 26

## 2020-10-22 PROCEDURE — 70496 CT ANGIOGRAPHY HEAD: CPT | Mod: 26

## 2020-10-22 PROCEDURE — 70498 CT ANGIOGRAPHY NECK: CPT

## 2020-10-22 PROCEDURE — 85610 PROTHROMBIN TIME: CPT

## 2020-10-22 PROCEDURE — 99285 EMERGENCY DEPT VISIT HI MDM: CPT | Mod: 25

## 2020-10-22 PROCEDURE — 93010 ELECTROCARDIOGRAM REPORT: CPT

## 2020-10-22 PROCEDURE — 85025 COMPLETE CBC W/AUTO DIFF WBC: CPT

## 2020-10-22 PROCEDURE — G0378: CPT

## 2020-10-22 PROCEDURE — 70450 CT HEAD/BRAIN W/O DYE: CPT

## 2020-10-22 RX ORDER — ASPIRIN/CALCIUM CARB/MAGNESIUM 324 MG
324 TABLET ORAL ONCE
Refills: 0 | Status: COMPLETED | OUTPATIENT
Start: 2020-10-22 | End: 2020-10-22

## 2020-10-22 RX ORDER — OMEGA-3 ACID ETHYL ESTERS 1 G
1 CAPSULE ORAL
Qty: 0 | Refills: 0 | DISCHARGE

## 2020-10-22 RX ORDER — ROSUVASTATIN CALCIUM 5 MG/1
1 TABLET ORAL
Qty: 0 | Refills: 0 | DISCHARGE

## 2020-10-22 RX ORDER — UBIDECARENONE 100 MG
1 CAPSULE ORAL
Qty: 0 | Refills: 0 | DISCHARGE

## 2020-10-22 RX ORDER — VORTIOXETINE 5 MG/1
1 TABLET, FILM COATED ORAL
Qty: 0 | Refills: 0 | DISCHARGE

## 2020-10-22 RX ORDER — ATORVASTATIN CALCIUM 80 MG/1
20 TABLET, FILM COATED ORAL AT BEDTIME
Refills: 0 | Status: DISCONTINUED | OUTPATIENT
Start: 2020-10-22 | End: 2020-10-27

## 2020-10-22 RX ADMIN — Medication 324 MILLIGRAM(S): at 14:42

## 2020-10-22 NOTE — CONSULT NOTE ADULT - ASSESSMENT
The patient is a 64y Male who is followed by neurology because of speech/language difficulty    Possible CVA/TIA, possible anxiety  has had similar event in setting of significant stress in past  no disabling symptoms or LVO so no alteplase or intervention  check MRI brain if no stroke may be discharged with outpt followup    If MRI (+) CVA will need admission for stroke workup    will follow with you    Tee Danielson MD PhD   [id number]

## 2020-10-22 NOTE — ED ADULT NURSE NOTE - CAS ELECT INFOMATION PROVIDED
DC instructions provided to the pt. Patient in understanding of all dc instructions. No further questions for the RN regarding dc instructions. VSS. Ambulatory./DC instructions

## 2020-10-22 NOTE — ED CDU PROVIDER DISPOSITION NOTE - PATIENT PORTAL LINK FT
You can access the FollowMyHealth Patient Portal offered by [facility] by registering at the following website: [email or web address]. By joining [facility]’s FollowMyHealth portal, you will also be able to view your health information using other applications (apps) compatible with our system.

## 2020-10-22 NOTE — ED ADULT NURSE NOTE - NSIMPLEMENTINTERV_GEN_ALL_ED
Implemented All Fall Risk Interventions:  [place] to call system. Call bell, personal items and telephone within reach. Instruct patient to call for assistance. Room bathroom lighting operational. Non-slip footwear when patient is off stretcher. Physically safe environment: no spills, clutter or unnecessary equipment. Stretcher in lowest position, wheels locked, appropriate side rails in place. Provide visual cue, wrist band, yellow gown, etc. Monitor gait and stability. Monitor for mental status changes and reorient to person, place, and time. Review medications for side effects contributing to fall risk. Reinforce activity limits and safety measures with patient and family.

## 2020-10-22 NOTE — ED ADULT TRIAGE NOTE - CHIEF COMPLAINT QUOTE
Patient arrived to ED today with c/o episode of dizziness, confusion, aphagia, slurred speech started around 0930 today.  Patient states he feels he's at about 80% percent back to himself.  Patient states he has had these episodes in the past no stroke found.

## 2020-10-22 NOTE — ED CDU PROVIDER INITIAL DAY NOTE - OBJECTIVE STATEMENT
64y M w/ hx HLD, chronic low back pain presenting for episode of confusion/dizziness that started at 9:30AM.  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  Says he tried reading out loud but could not get the words out.  Reports mild associated dizziness and feeling "foggy."  A/w shuffling of feet. Says symptoms improved within 1 hour; now mostly feels back to baseline.  Denies headache, weakness, numbness, chest pain.  Reports prior hx of similar episodes, for which he has seen Dr. Danielson of neurology.  Previously had EEG done that showed global slowing.  Pt admits to being under a lot of stress recently.  Code Stroke activated on arrival. Last MRI 3 years ago.     ED Course: Code stroke activated, CT head and CTA head/neck negative for acute pathology. Symptoms resolved and NIHSS currently 0. Seen by neurology- placed in CDU for MRI.

## 2020-10-22 NOTE — ED CDU PROVIDER INITIAL DAY NOTE - MEDICAL DECISION MAKING DETAILS
65yo male with episode of dizziness and confusion, now resolved, concerning for TIA vs CVA, placed in CDU for MRI brain, reassessement. Jess Catherine DO

## 2020-10-22 NOTE — CONSULT NOTE ADULT - SUBJECTIVE AND OBJECTIVE BOX
[facility]                                     Neurology at [place]                                 Erum Stone, & Emre                                  [address]                                        [place], [place], [address]                                             [phone]    CC: code stroke  HPI:  The patient is a 64y Male who presented with acute onset of langauge proicessing difficulty at 0930 today.  No weakness, numbness or balance issues.  He resolved by time he was seen in the ED.  Neurology is asked to evaluate    PAST MEDICAL & SURGICAL HISTORY:  Back pain    HLD (hyperlipidemia)        MEDICATIONS  (STANDING):  atorvastatin 20 milliGRAM(s) Oral at bedtime    MEDICATIONS  (PRN):      Allergies    No Known Allergies    Intolerances        SOCIAL HISTORY:  no tob,   no alcohol   no drugs    FAMILY HISTORY:  Family history of MI (myocardial infarction) (Mother)      ROS: 14 point ROS negative other than what is present in HPI or below    Vital Signs Last 24 Hrs  T(C): 36.8 (22 Oct 2020 15:41), Max: 37.5 (22 Oct 2020 13:05)  T(F): 98.2 (22 Oct 2020 15:41), Max: 99.5 (22 Oct 2020 13:05)  HR: 76 (22 Oct 2020 15:41) (76 - 88)  BP: 125/76 (22 Oct 2020 15:41) (117/67 - 159/76)  BP(mean): --  RR: 18 (22 Oct 2020 15:41) (18 - 18)  SpO2: 95% (22 Oct 2020 15:41) (93% - 96%)      General: NAD    Detailed Neurologic Exam:    Mental status: The patient is awake and alert and has normal attention span.  The patient is fully oriented in 3 spheres. The patient is oriented to current events. The patient is able to name objects, follow commands, repeat sentences.    Cranial nerves: Pupils equal and react symmetrically to light. There is no visual field deficit to confrontation. Extraocular motion is full with no nystagmus. There is no ptosis. Facial sensation is intact. Facial musculature is symmetric. Palate elevates symmetrically. Shoulder shrug is normal. Tongue is midline.    Motor: There is normal bulk and tone.  There is no tremor.  Strength is 5/5 in the right arm and leg.   Strength is 5/5 in the left arm and leg.    Sensation: Intact to light touch and pin in 4 extremities    Reflexes: 2+ throughout and plantar responses are flexor.    Cerebellar: There is no dysmetria on finger to nose testing.    Gait : deferred    NIH SS:  DATE: 10/22/2020  TIME: 1320  1A: Level of consciousness (0-3): 0  1B: Questions (0-2): 0  1C: Commands (0-2): 0  2: Gaze (0-2): 0  3: Visual fields (0-3): 0  4: Facial palsy (0-3): 0  MOTOR:  5A: Left arm motor drift (0-4): 0  5B: Right arm motor drift (0-4): 0  6A: Left leg motor drift (0-4): 0  6B: Right leg motor drift (0-4): 0  7: Limb ataxia (0-2): 0  SENSORY:  8: Sensation (0-2): 0  SPEECH:  9: Language (0-3): 0  10: Dysarthria (0-2): 0  EXTINCTION:  11: Extinction/inattention (0-2): 0    TOTAL SCORE:  0    prehospital mRS=0      LABS:                         16.3   13.55 )-----------( 345      ( 22 Oct 2020 13:31 )             48.7       10-22    138  |  98  |  10.0  ----------------------------<  159<H>  3.8   |  26.0  |  0.66    Ca    9.3      22 Oct 2020 13:31    TPro  7.0  /  Alb  4.5  /  TBili  0.5  /  DBili  x   /  AST  21  /  ALT  20  /  AlkPhos  63  10-22      PT/INR - ( 22 Oct 2020 13:31 )   PT: 12.0 sec;   INR: 1.04 ratio         PTT - ( 22 Oct 2020 13:31 )  PTT:30.1 sec    RADIOLOGY & ADDITIONAL STUDIES (independently reviewed unless otherwise noted):  CT head: no acute CVA, mass or blood  CTA head: no aneurysm, AVM, LVO or sig stenosis in COW  CTA neck: no sig carotid or vertebral stenosis  CT Perfusion head - CBF<30% volume 0ml, Tmax>6s volume =0ml

## 2020-10-22 NOTE — ED CDU PROVIDER INITIAL DAY NOTE - FAMILY HISTORY
Mother  Still living? Unknown  Family history of MI (myocardial infarction), Age at diagnosis: Age Unknown

## 2020-10-22 NOTE — ED ADULT NURSE NOTE - OBJECTIVE STATEMENT
63yo male c/o dizziness and periods of confusion since 0930. pt states he felt like he could read the words but not pronounce them. pt also reports periods of "instability" and "krinkle vision" pt ambulating steadily independently. code stroke activated and pt brought to ct upon arrival. ed code team and dr cross called to bedside. 65yo male c/o dizziness and periods of confusion since 0930. pt states he felt like he could read the words but not pronounce them. pt also reports periods of "instability" and "krinkle vision" pt ambulating steadily independently. code stroke activated and pt brought to ct upon arrival. ed code team and dr cross called to bedside. pt states has had similar incident in past when undergoing divorce 3 years ago. now states under a lot of stress @ home, work and recent break up w/ gf

## 2020-10-22 NOTE — ED CDU PROVIDER DISPOSITION NOTE - CLINICAL COURSE
abby stroke on arrival: initial imaging unremarkable. Seen by stroke neurologist and advised MRI.  MR completed and unremarkable.  Patient reports improvement of symptoms.  Still feels "a little foggy" but able to read book.  Denies headache.  Dr Danielson informed of MR findings: will have patient follow-up with cerebrovascular NP

## 2020-10-22 NOTE — ED PROVIDER NOTE - PROGRESS NOTE DETAILS
[place] CONTI: CT head / CTA / CT perfusion negative.  Will place in observation for MRI. seen by Dr. Danielson, agree with obs for MRI

## 2020-10-22 NOTE — PHARMACOTHERAPY INTERVENTION NOTE - COMMENTS
Spoke to patient at bedside. Does not take any anticoagulant or antiplatelet medications.
Stroke  No tPA; NIHSS 0, risk outweighs benefit

## 2020-10-22 NOTE — ED PROVIDER NOTE - PHYSICAL EXAMINATION
Constitutional: Awake, alert, in no acute distress  Eyes: no scleral icterus  HENT: normocephalic, atraumatic, moist oral mucosa  CV: RRR, no murmur  Pulm: non-labored respirations, CTAB  Abdomen: soft, non-tender, non-distended  Extremities: no edema, no deformity  Skin: no rash, no jaundice  Neuro: AAOx3, CNs II-XII intact, no facial asymmetry, 5/5 strength and sensation in all extremities, no dysmetria, no pronator drift, stable gait.  NIHSS = 0. Constitutional: Awake, alert, in no acute distress  Eyes: no scleral icterus  HENT: normocephalic, atraumatic, moist oral mucosa  CV: RRR, no murmur  Pulm: non-labored respirations, CTAB  Abdomen: soft, non-tender, non-distended  Extremities: no edema, no deformity  Skin: no rash, no jaundice  Neuro: AAOx3, CNs II-XII intact, no facial asymmetry, speech clear, 5/5 strength and sensation in all extremities, no dysmetria, no pronator drift, stable gait.  NIHSS = 0.

## 2020-10-22 NOTE — ED PROVIDER NOTE - NS ED ROS FT
Constitutional: no fever, no chills  Eyes: no vision changes  ENT: no nasal congestion, no sore throat  CV: no chest pain  Resp: no cough, no shortness of breath  GI: no abdominal pain, no vomiting, no diarrhea  : no dysuria  MSK: no joint pain  Skin: no rash  Neuro: no headache, no weakness, no paresthesias, +confusion

## 2020-10-22 NOTE — ED ADULT NURSE REASSESSMENT NOTE - NS ED NURSE REASSESS COMMENT FT1
neuro @ bedside
Assumed care of the patient at 1415. Verbal report received from Sadie GUPTA ED. Patient transferred to observation unit CDU 7. Patient A&Ox4. No s/s of distress. Remains asymptomatic. Denies CP/SOB or dizziness. NSR on CM. VSS. No neuro deficits noted. Denies numbness or tingling. NIH 0. Per NP Rispoli orders pt ok to eat, awaiting diet orders. Patient pending MRI testing. Ambulatory with steady gait. Patient in understanding of plan of care. Patient with no further questions for the RN. Resting in comfort. Call bell within reach and encouraged to use when assistance needed. Will continue to monitor.

## 2020-10-22 NOTE — ED CDU PROVIDER INITIAL DAY NOTE - PHYSICAL EXAMINATION
Constitutional: Awake, alert, in no acute distress  Eyes: no scleral icterus  HENT: normocephalic, atraumatic, moist oral mucosa  CV: RRR, no murmur  Pulm: non-labored respirations, CTAB  Abdomen: soft, non-tender, non-distended  Extremities: no edema, no deformity  Skin: no rash, no jaundice  Neuro: AAOx3, CNs II-XII intact, no facial asymmetry, speech clear, 5/5 strength and sensation in all extremities, no dysmetria, no pronator drift, stable gait.  NIHSS = 0.

## 2020-10-22 NOTE — ED PROVIDER NOTE - OBJECTIVE STATEMENT
64y M w/ hx HLD, presenting for episode of confusion/dizziness.  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  Says he tried reading out loud but could not get the words out.  Reports mild associated dizziness and feeling "foggy."  Says symptoms improved within 1 hour; now mostly feels back to baseline.  Denies headache, weakness, numbness, chest pain.  Reports prior hx of similar episodes, for which he has seen Dr. Danielson of neurology.  Code Stroke activated on arrival. 64y M w/ hx HLD, presenting for episode of confusion/dizziness.  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  Says he tried reading out loud but could not get the words out.  Reports mild associated dizziness and feeling "foggy."  Says symptoms improved within 1 hour; now mostly feels back to baseline.  Denies headache, weakness, numbness, chest pain.  Reports prior hx of similar episodes, for which he has seen Dr. Danielson of neurology.  Previously had EEG done that showed global slowing.  Code Stroke activated on arrival. 64y M w/ hx HLD, presenting for episode of confusion/dizziness.  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  Says he tried reading out loud but could not get the words out.  Reports mild associated dizziness and feeling "foggy."  Says symptoms improved within 1 hour; now mostly feels back to baseline.  Denies headache, weakness, numbness, chest pain.  Reports prior hx of similar episodes, for which he has seen Dr. Danielson of neurology.  Previously had EEG done that showed global slowing.  Pt admits to being under a lot of stress recently.  Code Stroke activated on arrival.

## 2020-10-22 NOTE — ED PROVIDER NOTE - ATTENDING CONTRIBUTION TO CARE
64 YOM pmh HLD here for episode of confusion and trouble speaking. Is a  and was at work when he was having trouble speaking and reading at 930a. He reports feeling foggy. Is coming back to baseline now, reports feeling much better now. Spoke with daughter who picked him up from work around noon and was speaking and acting normally. Reports prior hx of similar episodes, for which he has seen Dr. Danielson of neurology.  Code Stroke activated at triage. Denies f/c, cp, sob, abd pain, numbness, tingling  AP - FSG 140s. NIH scale of 0 here. will get CT/angio and perfusion. neuro to see for possible TIA.

## 2020-10-22 NOTE — ED CDU PROVIDER DISPOSITION NOTE - CARE PROVIDER_API CALL
Tee Danielson  NEUROLOGY  [address] [facility], [facility] [address]  [place] [address]  Phone: [phone]  Fax: [phone]  Follow Up Time: 7-10 Days

## 2020-10-22 NOTE — ED PROVIDER NOTE - CLINICAL SUMMARY MEDICAL DECISION MAKING FREE TEXT BOX
64y M presenting for episode of confusion/garbled speech (? expressive aphasia) at 9:30 AM today.  Pt now with complete resolution of symptoms.  Code Stroke activated on arrival.  Dr. Danielson of neurology at bedside to evaluate pt.  Pt with NIHSS of 0, so will not give tPA.  Plan for observation for MRI if initial workup negative.

## 2020-10-28 PROBLEM — M54.9 DORSALGIA, UNSPECIFIED: Chronic | Status: ACTIVE | Noted: 2020-10-22

## 2020-10-28 PROBLEM — E78.5 HYPERLIPIDEMIA, UNSPECIFIED: Chronic | Status: ACTIVE | Noted: 2020-10-22

## 2020-11-05 ENCOUNTER — APPOINTMENT (OUTPATIENT)
Dept: NEUROLOGY | Facility: CLINIC | Age: 65
End: 2020-11-05
Payer: COMMERCIAL

## 2020-11-05 ENCOUNTER — NON-APPOINTMENT (OUTPATIENT)
Age: 65
End: 2020-11-05

## 2020-11-05 VITALS
TEMPERATURE: 97.9 F | WEIGHT: 200 LBS | HEART RATE: 82 BPM | DIASTOLIC BLOOD PRESSURE: 75 MMHG | SYSTOLIC BLOOD PRESSURE: 130 MMHG | HEIGHT: 70 IN | BODY MASS INDEX: 28.63 KG/M2 | OXYGEN SATURATION: 97 %

## 2020-11-05 DIAGNOSIS — Z84.89 FAMILY HISTORY OF OTHER SPECIFIED CONDITIONS: ICD-10-CM

## 2020-11-05 DIAGNOSIS — G45.9 TRANSIENT CEREBRAL ISCHEMIC ATTACK, UNSPECIFIED: ICD-10-CM

## 2020-11-05 PROCEDURE — 99072 ADDL SUPL MATRL&STAF TM PHE: CPT

## 2020-11-05 PROCEDURE — 99215 OFFICE O/P EST HI 40 MIN: CPT

## 2020-11-05 RX ORDER — UBIQUINOL 100 MG
CAPSULE ORAL
Refills: 0 | Status: ACTIVE | COMMUNITY

## 2020-11-05 RX ORDER — METHYLPREDNISOLONE 4 MG/1
4 TABLET ORAL
Qty: 1 | Refills: 0 | Status: DISCONTINUED | COMMUNITY
Start: 2020-10-16 | End: 2020-11-05

## 2020-11-05 RX ORDER — ROSUVASTATIN CALCIUM 5 MG/1
5 TABLET, FILM COATED ORAL
Refills: 0 | Status: ACTIVE | COMMUNITY

## 2020-11-05 RX ORDER — CEPHALEXIN 500 MG/1
500 TABLET ORAL
Qty: 28 | Refills: 0 | Status: DISCONTINUED | COMMUNITY
Start: 2020-08-27 | End: 2020-11-05

## 2020-11-05 RX ORDER — OMEGA-3/DHA/EPA/FISH OIL 300-1000MG
1000 CAPSULE ORAL
Refills: 0 | Status: ACTIVE | COMMUNITY

## 2020-11-09 PROBLEM — G45.9 TIA (TRANSIENT ISCHEMIC ATTACK): Status: ACTIVE | Noted: 2020-11-05

## 2020-11-09 NOTE — DISCUSSION/SUMMARY
[FreeTextEntry1] : Mr. 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 He correlates these occurrences with high levels of stress/anxiety. Due to his age and risk factors of elevated blood glucose and cholesterol, I advised him to begin ASA 81mg QD for stroke prevention. I will provide him with a list of local social workers or mental health counselors that accept his insurance for counseling. Patient advised to follow-up with PCP for blood pressure, glucose and lipid management. Plan to perform 48hr aEEG to rule-out seizure (to be performed at 370 on 2/2/21). Plan to follow-up with Dr. Danielson on 2/9/21. \par \par Patient was instructed to call the office if his condition worsens or he experiences any new or concerning symptoms. Patient educated on safety precautions and signs/symptoms of stroke. Patient instructed to call 911 if he begins to experience stroke symptoms.  All of patient's questions and concerns were addressed.

## 2020-11-09 NOTE — PHYSICAL EXAM
[Paresis Pronator Drift Right-Sided] : no pronator drift on the right [Paresis Pronator Drift Left-Sided] : no pronator drift on the left [Motor Strength Upper Extremities Bilaterally] : strength was normal in both upper extremities [Motor Strength Lower Extremities Bilaterally] : strength was normal in both lower extremities [Limited Balance] : balance was intact [Past-pointing] : there was no past-pointing [Tremor] : no tremor present [Dysdiadochokinesia Bilaterally] : not present [Coordination - Dysmetria Impaired Finger-to-Nose Bilateral] : not present

## 2020-11-09 NOTE — REVIEW OF SYSTEMS
[Depression] : no depression [Decr. Concentrating Ability] : no decrease in concentrating ability [Facial Weakness] : no facial weakness [Arm Weakness] : no arm weakness [Hand Weakness] : no hand weakness [Leg Weakness] : no leg weakness [Numbness] : no numbness [Tingling] : no tingling [Dizziness] : no dizziness [Fainting] : no fainting [Lightheadedness] : no lightheadedness [Vertigo] : no vertigo [Cluster Headache] : no cluster headache [Migraine Headache] : no migraine headache [Tension Headache] : no tension-type headache [Difficulty Walking] : no difficulty walking [Inability to Walk] : able to walk [Ataxia] : no ataxia [Frequent Falls] : not falling [FreeTextEntry3] : Floaters [FreeTextEntry9] : Intermittent foot cramping, relieved with standing and putting weight on foot.

## 2020-11-09 NOTE — CONSULT LETTER
[FreeTextEntry1] : \par I had the pleasure of seeing your patient, QUYNH SAMSON, in my office today. Please see my note below. \par \par If you have any questions, please do not hesitate to contact me.\par \par \par \par \par \par \par \par \par \par \par   [FreeTextEntry3] : Nelly Hammond NP\par [facility] [place]\par [address] [place] [address]\par Phone: [phone]; Fax: [phone]

## 2020-11-09 NOTE — HISTORY OF PRESENT ILLNESS
[FreeTextEntry1] : PCP: Dr. Ladinsky, [place]\par \par Mr. Dominguez is a 65 year-old RH man with PMH of HLD, migraines and seizures who presents to the office today for a post-hospitalization evaluation. He presented to the ED at [facility] on 10/22/20 with an episode of confusion and dizziness.  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  He says that he could see letters clearly but the words/letters didn't make sense and describes his vision as appearing "crinkled". 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 According to the ED Provider Note patient also reported associated mild dizziness and feeling "foggy.  Denies headache, weakness, numbness, chest pain.  He reports similar episodes have occurred 3-4x, with the first episode was 6-7 years ago. He has been on Keppra for these seizure-like episodes but it was discontinued several years ago. He associates one of these occurrences with taking steroids for back pain and associates other previous episodes, including this most recent one, with stress. Previously had EEG done that showed global slowing.  \par \par Hospital Work-Up\par Brain CT without contrast: No evidence of intracranial hemorrhage or mass effect. If clinical suspicion for acute infarct persists, brain MRI can be obtained.\par CT perfusion: No evidence of core infarct.\par CT angiography neck: No hemodynamically significant stenosis of the bilateral cervical ICAs using NASCET criteria.  Patent vertebral arteries.  No evidence of vascular dissection.\par CT angiography brain: No large vessel occlusion. No evidence of aneurysm.\par MR head: There is no mass, intracranial hemorrhage, or acute infarction.\par \par

## 2021-02-02 ENCOUNTER — NON-APPOINTMENT (OUTPATIENT)
Age: 66
End: 2021-02-02

## 2021-02-02 ENCOUNTER — APPOINTMENT (OUTPATIENT)
Dept: NEUROLOGY | Facility: CLINIC | Age: 66
End: 2021-02-02
Payer: COMMERCIAL

## 2021-02-02 PROCEDURE — 95816 EEG AWAKE AND DROWSY: CPT

## 2021-02-02 PROCEDURE — 93040 RHYTHM ECG WITH REPORT: CPT

## 2021-02-03 PROCEDURE — 95721 EEG PHY/QHP>36<60 HR W/O VID: CPT

## 2021-02-03 PROCEDURE — 95708 EEG WO VID EA 12-26HR UNMNTR: CPT

## 2021-02-03 PROCEDURE — 95700 EEG CONT REC W/VID EEG TECH: CPT

## 2021-02-03 PROCEDURE — 99072 ADDL SUPL MATRL&STAF TM PHE: CPT

## 2021-02-05 ENCOUNTER — NON-APPOINTMENT (OUTPATIENT)
Age: 66
End: 2021-02-05

## 2021-02-08 ENCOUNTER — NON-APPOINTMENT (OUTPATIENT)
Age: 66
End: 2021-02-08

## 2021-02-09 ENCOUNTER — APPOINTMENT (OUTPATIENT)
Dept: NEUROLOGY | Facility: CLINIC | Age: 66
End: 2021-02-09

## 2021-02-10 ENCOUNTER — APPOINTMENT (OUTPATIENT)
Dept: NEUROLOGY | Facility: CLINIC | Age: 66
End: 2021-02-10
Payer: COMMERCIAL

## 2021-02-10 VITALS
HEIGHT: 70 IN | TEMPERATURE: 98.5 F | SYSTOLIC BLOOD PRESSURE: 135 MMHG | DIASTOLIC BLOOD PRESSURE: 76 MMHG | BODY MASS INDEX: 28.63 KG/M2 | OXYGEN SATURATION: 98 % | HEART RATE: 82 BPM | WEIGHT: 200 LBS

## 2021-02-10 DIAGNOSIS — G43.909 MIGRAINE, UNSPECIFIED, NOT INTRACTABLE, W/OUT STATUS MIGRAINOSUS: ICD-10-CM

## 2021-02-10 DIAGNOSIS — L40.9 PSORIASIS, UNSPECIFIED: ICD-10-CM

## 2021-02-10 DIAGNOSIS — Z01.818 ENCOUNTER FOR OTHER PREPROCEDURAL EXAMINATION: ICD-10-CM

## 2021-02-10 PROCEDURE — 99072 ADDL SUPL MATRL&STAF TM PHE: CPT

## 2021-02-10 PROCEDURE — 99215 OFFICE O/P EST HI 40 MIN: CPT

## 2021-02-12 PROBLEM — L40.9 PSORIASIS: Status: ACTIVE | Noted: 2021-02-12

## 2021-02-12 PROBLEM — G43.909 MIGRAINES: Status: ACTIVE | Noted: 2021-02-12

## 2021-02-12 NOTE — CONSULT LETTER
[Dear  ___] : Dear  [unfilled], [Sincerely,] : Sincerely, [FreeTextEntry1] : I had the pleasure of seeing your patient, QUYNH SAMSON, in my office today. Please see my note below. \par \par If you have any questions, please do not hesitate to contact me.  [FreeTextEntry3] : Marichuy Harris MD\par  of Neurology\par [facility]\par [facility]\par [facility] [place]\par [address] [place] [address]\par Phone: [phone]; Fax: [phone]\par \par

## 2021-02-12 NOTE — ASSESSMENT
[FreeTextEntry1] : QUYNH SAMSON is a 65 year-old RH male with a history of migraine headache, anxiety, HLD, psoriasis who presents to establish care for seizure-like activity. MRI unremarkable. EEG with equivocal RT sharp.\par \par \par - Admit to EMU 3/1. The purposes of the EMU admission are 1) to differentiate epileptic from nonepileptic events; 2)\par  to establish the first diagnosis of a seizure disorder, which is medically necessary to guide clinical management and select the most appropriate therapeutic regimen. The expected length of stay is 3-4 days.\par - will most likely start LTG \par - f/u after EMU\par \par \par Personally reviewed all images, labs and other studies. More than 50% of time spent on counseling and educating patient on differential, workup, disease course, and treatment/management. All questions and concerns answered and addressed in detail to patient's complete satisfaction. Patient verbalized understanding and agreed to plan.\par

## 2021-02-12 NOTE — HISTORY OF PRESENT ILLNESS
[FreeTextEntry1] : PCP: Dr. Ladinsky\par \par This is a 65 year-old RH male with a history of migraine headache, anxiety, HLD, psoriasis who is referred by ELLYN HUSSEIN,VERONICA SEPULVEDA for evaluation and management of seizure-like activity. \par \par First episode occurred Valentine's day on 2012. Pt was a . 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 He looked around the room, and couldn’t recall any of his colleague’s names. Felt disoriented, but still functional and able to carry out conversations. Went to [facility]. Saw Dr. Danielson. EEG at the time demonstrated focal slowing. He was started on LEV, which was eventually discontinued due to irritability.  \par \par Second episode 1-2 years later, again involving making sense of written words. He could see the words clearly, and could probably read out individual letters and even words, but he had trouble processed an entire sentence. Felt a little foggy in the head. Completed resolved in an hour. \par \par Third episode happened 7/2017, in the middle of his divorce. He was talking on the phone and suddenly got confused and had difficulty forming words and producing coherence speech. Lasted about 3 hrs.\par \par Fourth episode again occurred in school, while he was sitting in front of the computer and looking at his notes. 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 Felt foggy. Presented to [facility] ER. CTH, CTA H/N, CTP, MRI brain were all neg.\par \par Underwent 48hr aEEG 2/2-2/4/21, which found one possible right temp discharge. 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 “Crinkled” vision has never overlapped with these episodes of difficulty processing written words.,\par \par SEIZURE/SPELL DESCRIPTION AND TYPE:\par Type #1\par Severity: a form of aphasia\par Onset: 2/2012\par Quality & associated signs/symptoms: difficulty processing and making sense of written sentences, foggy\par Duration: 1-3 hours\par Timing: x5 total, last 10/22/20 \par Modifying factors: triggered by stress\par Diurnal Variation: none\par \par SEIZURE RISK FACTORS:\par Father has something similar (confusion, focal impaired sounds like, has them since kid), on PB. Patient was a product of normal pregnancy and delivery. No history of febrile seizure, TBI, or CNS infection.\par \par CURRENT AED:\par none\par \par PREVIOUS AED:\par LEV – irritability \par \par IMAGING: \par MRI brain w/o 10/22/20 ([facility]): neg\par \par NEUROPHYSIOLOGY:\par cvEEG 2/2- 2/4/21 (370): 1) one possible RT sharp; 2) intermittent slowing in the BT \par \par NEUROPSYCHOLOGY: \par none\par \par SH:\par Retired . .

## 2021-02-19 ENCOUNTER — TRANSCRIPTION ENCOUNTER (OUTPATIENT)
Age: 66
End: 2021-02-19

## 2021-02-26 ENCOUNTER — APPOINTMENT (OUTPATIENT)
Dept: DISASTER EMERGENCY | Facility: CLINIC | Age: 66
End: 2021-02-26

## 2021-02-27 LAB — SARS-COV-2 N GENE NPH QL NAA+PROBE: NOT DETECTED

## 2021-03-01 ENCOUNTER — INPATIENT (INPATIENT)
Facility: HOSPITAL | Age: 66
LOS: 1 days | Discharge: HOME | DRG: 101 | End: 2021-03-03
Attending: HOSPITALIST | Admitting: HOSPITALIST
Payer: COMMERCIAL

## 2021-03-01 VITALS
HEART RATE: 83 BPM | SYSTOLIC BLOOD PRESSURE: 146 MMHG | DIASTOLIC BLOOD PRESSURE: 74 MMHG | OXYGEN SATURATION: 94 % | TEMPERATURE: 98 F | RESPIRATION RATE: 18 BRPM

## 2021-03-01 DIAGNOSIS — G40.109 LOCALIZATION-RELATED (FOCAL) (PARTIAL) SYMPTOMATIC EPILEPSY AND EPILEPTIC SYNDROMES WITH SIMPLE PARTIAL SEIZURES, NOT INTRACTABLE, W/OUT STATUS EPILEPTICUS: ICD-10-CM

## 2021-03-01 DIAGNOSIS — G40.909 EPILEPSY, UNSPECIFIED, NOT INTRACTABLE, WITHOUT STATUS EPILEPTICUS: ICD-10-CM

## 2021-03-01 DIAGNOSIS — R56.9 UNSPECIFIED CONVULSIONS: ICD-10-CM

## 2021-03-01 DIAGNOSIS — Z98.890 OTHER SPECIFIED POSTPROCEDURAL STATES: Chronic | ICD-10-CM

## 2021-03-01 LAB
A1C WITH ESTIMATED AVERAGE GLUCOSE RESULT: 6 % — HIGH (ref 4–5.6)
ANION GAP SERPL CALC-SCNC: 9 MMOL/L — SIGNIFICANT CHANGE UP (ref 5–17)
BUN SERPL-MCNC: 11 MG/DL — SIGNIFICANT CHANGE UP (ref 8–20)
CALCIUM SERPL-MCNC: 9.6 MG/DL — SIGNIFICANT CHANGE UP (ref 8.6–10.2)
CHLORIDE SERPL-SCNC: 103 MMOL/L — SIGNIFICANT CHANGE UP (ref 98–107)
CO2 SERPL-SCNC: 29 MMOL/L — SIGNIFICANT CHANGE UP (ref 22–29)
CREAT SERPL-MCNC: 0.73 MG/DL — SIGNIFICANT CHANGE UP (ref 0.5–1.3)
ESTIMATED AVERAGE GLUCOSE: 126 MG/DL — HIGH (ref 68–114)
GLUCOSE SERPL-MCNC: 102 MG/DL — HIGH (ref 70–99)
HCT VFR BLD CALC: 45.1 % — SIGNIFICANT CHANGE UP (ref 39–50)
HGB BLD-MCNC: 15.1 G/DL — SIGNIFICANT CHANGE UP (ref 13–17)
MCHC RBC-ENTMCNC: 31.5 PG — SIGNIFICANT CHANGE UP (ref 27–34)
MCHC RBC-ENTMCNC: 33.5 GM/DL — SIGNIFICANT CHANGE UP (ref 32–36)
MCV RBC AUTO: 94.2 FL — SIGNIFICANT CHANGE UP (ref 80–100)
PLATELET # BLD AUTO: 339 K/UL — SIGNIFICANT CHANGE UP (ref 150–400)
POTASSIUM SERPL-MCNC: 4.7 MMOL/L — SIGNIFICANT CHANGE UP (ref 3.5–5.3)
POTASSIUM SERPL-SCNC: 4.7 MMOL/L — SIGNIFICANT CHANGE UP (ref 3.5–5.3)
RBC # BLD: 4.79 M/UL — SIGNIFICANT CHANGE UP (ref 4.2–5.8)
RBC # FLD: 11.9 % — SIGNIFICANT CHANGE UP (ref 10.3–14.5)
SODIUM SERPL-SCNC: 141 MMOL/L — SIGNIFICANT CHANGE UP (ref 135–145)
WBC # BLD: 6.83 K/UL — SIGNIFICANT CHANGE UP (ref 3.8–10.5)
WBC # FLD AUTO: 6.83 K/UL — SIGNIFICANT CHANGE UP (ref 3.8–10.5)

## 2021-03-01 PROCEDURE — 99223 1ST HOSP IP/OBS HIGH 75: CPT

## 2021-03-01 PROCEDURE — 99253 IP/OBS CNSLTJ NEW/EST LOW 45: CPT

## 2021-03-01 RX ORDER — ENOXAPARIN SODIUM 100 MG/ML
40 INJECTION SUBCUTANEOUS DAILY
Refills: 0 | Status: DISCONTINUED | OUTPATIENT
Start: 2021-03-01 | End: 2021-03-03

## 2021-03-01 RX ORDER — ASPIRIN/CALCIUM CARB/MAGNESIUM 324 MG
1 TABLET ORAL
Qty: 0 | Refills: 0 | DISCHARGE

## 2021-03-01 RX ORDER — ATORVASTATIN CALCIUM 80 MG/1
20 TABLET, FILM COATED ORAL AT BEDTIME
Refills: 0 | Status: DISCONTINUED | OUTPATIENT
Start: 2021-03-01 | End: 2021-03-03

## 2021-03-01 RX ORDER — OMEGA-3 ACID ETHYL ESTERS 1 G
1 CAPSULE ORAL DAILY
Refills: 0 | Status: DISCONTINUED | OUTPATIENT
Start: 2021-03-01 | End: 2021-03-03

## 2021-03-01 RX ORDER — POLYETHYLENE GLYCOL 3350 17 G/17G
17 POWDER, FOR SOLUTION ORAL DAILY
Refills: 0 | Status: DISCONTINUED | OUTPATIENT
Start: 2021-03-01 | End: 2021-03-03

## 2021-03-01 RX ORDER — PETROLATUM,WHITE
1 JELLY (GRAM) TOPICAL DAILY
Refills: 0 | Status: DISCONTINUED | OUTPATIENT
Start: 2021-03-01 | End: 2021-03-03

## 2021-03-01 RX ORDER — ASPIRIN/CALCIUM CARB/MAGNESIUM 324 MG
81 TABLET ORAL DAILY
Refills: 0 | Status: DISCONTINUED | OUTPATIENT
Start: 2021-03-01 | End: 2021-03-03

## 2021-03-01 NOTE — CONSULT NOTE ADULT - SUBJECTIVE AND OBJECTIVE BOX
[facility]                                                                     Marichuy Harris MD                                              Epilepsy Consult #: [phone] ([phone])                                               Office: [address] [place] [address], [address]                                                 Phone: [phone]; Fax: [phone]                            ==============================================    EPILEPSY INITIAL CONSULT NOTE      HPI  This is a 65 year-old RH male with a history of migraine headache, anxiety, HLD, psoriasis who presents to EMU to differentiate epileptic from nonepileptic events.    First episode occurred Vines's day on 2012. Pt was a . 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 He looked around the room, and couldn’t recall any of his colleague’s names. Felt disoriented, but still functional and able to carry out conversations. Went to [facility]. Saw Dr. Danielson. EEG at the time demonstrated focal slowing. He was started on LEV, which was eventually discontinued due to irritability.     Second episode 1-2 years later, again involving making sense of written words. He could see the words clearly, and could probably read out individual letters and even words, but he had trouble processed an entire sentence. Felt a little foggy in the head. Completed resolved in an hour.     Third episode happened 7/2017, in the middle of his divorce. He was talking on the phone and suddenly got confused and had difficulty forming words and producing coherence speech. Lasted about 3 hrs.    Fourth episode again occurred in school, while he was sitting in front of the computer and looking at his notes. Suddenly, he couldn’t make sense of the words, couldn’t process the words to understand the sentences.    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 Felt foggy. Presented to [facility] ER. CTH, CTA H/N, CTP, MRI brain were all neg.    Underwent 48hr aEEG 2/2-2/4/21, which found one possible right temp discharge. Has rare occurrence of "crinkled" vision, and had been told they were ocular migraine. "Crinkled" vision has never overlapped with these episodes of difficulty processing written words.,    SEIZURE/SPELL DESCRIPTION AND TYPE:  Type #1  Severity: a form of aphasia  Onset: 2/2012  Quality & associated signs/symptoms: difficulty processing and making sense of written sentences, foggy  Duration: 1-3 hours  Timing: x5 total, last 10/22/20   Modifying factors: triggered by stress  Diurnal Variation: none    SEIZURE RISK FACTORS:  Father has something similar (confusion, focal impaired sounds like, has them since kid), on PB. Patient was a product of normal pregnancy and delivery. No history of febrile seizure, TBI, or CNS infection.    CURRENT AED:  none    PREVIOUS AED:  LEV – irritability     IMAGING:   MRI brain w/o 10/22/20 ([facility]): neg    NEUROPHYSIOLOGY:  cvEEG 2/2- 2/4/21 (370): 1) one possible RT sharp; 2) intermittent slowing in the BT     NEUROPSYCHOLOGY:   none      PAST MEDICAL HISTORY:  Psoriasis    Back pain    HLD (hyperlipidemia)      PAST SURGICAL HISTORY:   History of umbilical hernia repair      MEDICATIONS:   AQUAPHOR (petrolatum Ointment) 1 Application(s) Topical daily  aspirin enteric coated 81 milliGRAM(s) Oral daily  atorvastatin 20 milliGRAM(s) Oral at bedtime  calcium carbonate 1250 mG  + Vitamin D (OsCal 500 + D) 1 Tablet(s) Oral daily  enoxaparin Injectable 40 milliGRAM(s) SubCutaneous daily  LORazepam   Injectable 2 milliGRAM(s) IV Push once PRN Seizure activity  multivitamin 1 Tablet(s) Oral daily  omega-3-Acid Ethyl Esters 1 Gram(s) Oral daily  polyethylene glycol 3350 17 Gram(s) Oral daily PRN Constipation    ALLERGIES:   No Known Allergies    FAMILY HISTORY:  Non-contributory    SOCIAL HISTORY:   Denied tobacco, illicit drugs. Social drinker.    ROS:  Negative for constitutional, skin, eyes, ENT, respiratory, cardiovascular, gastrointestinal, genitourinary, musculoskeletal, neurologic, psychiatric, hematology/lymphatics, endocrine, allergic/immunologic.      VITALS:  T(C): 36.6 (03-01-21 @ 16:44), Max: 36.6 (03-01-21 @ 16:44)  HR: 83 (03-01-21 @ 16:44) (83 - 83)  BP: 146/74 (03-01-21 @ 16:44) (146/74 - 146/74)  ABP: --  RR: 18 (03-01-21 @ 16:44) (18 - 18)  SpO2: 94% (03-01-21 @ 16:44) (94% - 94%)  CVP(cm H2O): --    GENERAL PHYSICAL EXAM:  GEN: no distress  HEENT: NCAT, OP clear  EYES: sclera white, conjunctiva clear, no nystagmus  NECK: supple  CV: RRR, no murmur     		  PULM: CTAB, no wheezing  ABD: soft ABD, +BS, NT  EXT: peripheral pulse intact, no cyanosis  MSK: muscle tone and strength normal  SKIN: warm, dry, no rash or lesion on exposed skin    NEUROLOGICAL EXAM:  Mental Status  Orientation: alert and oriented to person, place, time, and situation   Language: clear and fluent, intact comprehension and repetition, intact naming and reading    Cranial Nerves  II: full visual fields intact   III, IV, VI: PERRL, EOMI  V, VII: facial sensation and movement intact and symmetric   VIII: hearing intact   IX, X: uvula midline, soft palate elevates normally   XI: BL shoulder shrug intact   XII: tongue midline    Motor  5/5 BUE and BLE                 Tone and bulk are normal in upper and lower limbs  No pronator drift    Sensation  Intact to light touch and pinprick in all 4 EXTs    Reflex  2+ in BL biceps and patella                                   Plantar responses downward bilaterally    Coordination  Normal FTN bilaterally    Gait  Deferred      LABS:                         15.1   6.83  )-----------( 339      ( 01 Mar 2021 14:37 )             45.1     03-01    141  |  103  |  11.0  ----------------------------<  102<H>  4.7   |  29.0  |  0.73    Ca    9.6      01 Mar 2021 14:37      CAPILLARY BLOOD GLUCOSE

## 2021-03-01 NOTE — CONSULT NOTE ADULT - MINUTES
70
Alert-The patient is alert, awake and responds to voice. The patient is oriented to time, place, and person. The triage nurse is able to obtain subjective information.

## 2021-03-01 NOTE — H&P ADULT - ASSESSMENT
66yr old male with PMH of HPL, Psoariasis, Anxiety, migraine headaches, recent surgery for umbilical hernia 2weeks ago presented today for recurring episodes of confusion and word -finding difficulty for video EEG monitoring.     # Seizure like episodes - EEG monitoring per Neurology   Sz precautions  Not on AEDs at home     # HPL - on Rosuvastatin   # Psoariasis - not on any medications, keep skin moisturized   # Anxiety - not on meds   # s/p umbilical hernia repair - reports constipation - May start miralax prn   # DVT px - lovenox   Labs     Pt refused peripheral iv line - explained and discussed the necessity. continues to refuse - Dr Harris aware.

## 2021-03-01 NOTE — H&P ADULT - HISTORY OF PRESENT ILLNESS
66yr old male with PMH of HPL, Psoariasis, Anxiety, migraine headaches, recent surgery for umbilical hernia 2weeks ago presented today for recurring episodes of confusion and word -finding difficulty for video EEG monitoring. These episodes started 9yrs ago, 4so far but patient feels, these all may have happened in stressful situations.   Offers no complaints at present. He has healed well from his umbilical hernia repair 2weeks ago, c/.o mild constipation, last BM today.

## 2021-03-01 NOTE — H&P ADULT - NSICDXFAMILYHX_GEN_ALL_CORE_FT
FAMILY HISTORY:  Mother  Still living? Unknown  Family history of MI (myocardial infarction), Age at diagnosis: Age Unknown

## 2021-03-01 NOTE — H&P ADULT - NSHPPHYSICALEXAM_GEN_ALL_CORE
PHYSICAL EXAM:    GENERAL: NAD, well-groomed, well-developed  HEAD:  Atraumatic, Normocephalic . left temple - maculopapular lesion scaly - around 4x3cm in size visible under his hairline  EYES: EOMI, PERRLA, conjunctiva and sclera clear  ENMT: Moist mucous membranes, Good dentition, No lesions  NECK: Supple, No JVD, Normal thyroid  NERVOUS SYSTEM:  Alert & Oriented X3, Good concentration;  CHEST/LUNG: Clear to percussion bilaterally; No rales, rhonchi  HEART: Regular rate and rhythm; No murmurs  ABDOMEN: Soft, Nontender, Nondistended; infraumbilical healing surgical scar 4cm long+ Bowel sounds present  EXTREMITIES: No clubbing, cyanosis, or edema

## 2021-03-01 NOTE — CONSULT NOTE ADULT - ASSESSMENT
65 year-old RH male with a history of migraine headache, anxiety, HLD, psoriasis who presents to EMU to differentiate epileptic from nonepileptic events. Personally reviewed all imagines, labs, EEG and other studies.      Recommendation:  - cvEEG   - hold antiepileptic medication for now  - seizure precaution        Thank you for allowing Epilepsy to participate in the care of this patient.   ______________________  Marichuy Harris MD   [facility]  Cell: [phone]  Epilepsy Consult #: [phone] ([phone])

## 2021-03-02 LAB
HCV AB S/CO SERPL IA: 0.07 S/CO — SIGNIFICANT CHANGE UP (ref 0–0.99)
HCV AB SERPL-IMP: SIGNIFICANT CHANGE UP

## 2021-03-02 PROCEDURE — 99233 SBSQ HOSP IP/OBS HIGH 50: CPT

## 2021-03-02 PROCEDURE — 95720 EEG PHY/QHP EA INCR W/VEEG: CPT

## 2021-03-02 RX ORDER — LAMOTRIGINE 25 MG/1
25 TABLET, ORALLY DISINTEGRATING ORAL AT BEDTIME
Refills: 0 | Status: DISCONTINUED | OUTPATIENT
Start: 2021-03-02 | End: 2021-03-03

## 2021-03-02 RX ADMIN — Medication 1 TABLET(S): at 07:41

## 2021-03-02 RX ADMIN — Medication 81 MILLIGRAM(S): at 07:41

## 2021-03-02 RX ADMIN — LAMOTRIGINE 25 MILLIGRAM(S): 25 TABLET, ORALLY DISINTEGRATING ORAL at 20:36

## 2021-03-02 RX ADMIN — Medication 1 GRAM(S): at 07:40

## 2021-03-02 RX ADMIN — Medication 1 APPLICATION(S): at 07:41

## 2021-03-02 RX ADMIN — POLYETHYLENE GLYCOL 3350 17 GRAM(S): 17 POWDER, FOR SOLUTION ORAL at 07:43

## 2021-03-02 NOTE — PROGRESS NOTE ADULT - ATTENDING COMMENTS
pt seen and evaluated at bedside    66yr old with seizure-like episodes - here for video EEG - EEG banormal - started on lamotrigine per Neurology - appreciate recs   Patient offers no  complaints.  agree with above     Prediabetes - A1C 6 - dietary and lifestyle modification - f/u with PMD  Will follow

## 2021-03-02 NOTE — PROGRESS NOTE ADULT - ASSESSMENT
66yr old male with PMH of HPL, Psoriasis, Anxiety, migraine headaches, recent surgery for umbilical hernia 2weeks ago presented for recurring episodes of confusion and word -finding difficulty for video EEG monitoring.     # Seizure like episodes - EEG monitoring per Neurology   Sz precautions  Not on AEDs at home     # HPL - on Rosuvastatin   # Psoriasis - not on any medications, keep skin moisturized   # Anxiety - not on meds   # s/p umbilical hernia repair - reported constipation - continue miralax prn   # DVT px - lovenox     Pt refused peripheral iv line - explained and discussed the necessity. continues to refuse - Dr Harris aware.  66yr old male with PMH of HPL, Psoriasis, Anxiety, migraine headaches, recent surgery for umbilical hernia 2weeks ago presented for recurring episodes of confusion and word -finding difficulty for video EEG monitoring.     # Seizure like episodes - EEG monitoring per Neurology   Sz precautions  Not on AEDs at home     # HPL - on Rosuvastatin   # Psoriasis - not on any medications, keep skin moisturized   # Anxiety - not on meds   # s/p umbilical hernia repair - reported constipation - continue miralax prn   # DVT px - lovenox     Pt continues to refuse peripheral iv line - explained and discussed the necessity.  Dr Harris aware.

## 2021-03-02 NOTE — EEG REPORT - NS EEG TEXT BOX
[facility] Epilepsy Monitoring Unit Report  [facility]: [address], [place] [address], Phone [phone] [facility]: [address], [place] [address], Phone [phone] [place] Office: [address], [address], [place] [address] Phone [phone]  [facility]: [address] [place] [address], Phone [phone] [place] Office: [address] [place] [address], Phone [phone]  Patient Name: Zach Dominguez   Age: 65 year, : 1955 Patient ID: -, MRN #: -, Berman: -  Physician Ordering Inpatient EEG: Selena Gong Referral Source to EMU: elective admission – Dr. Harris  EMU Study Started: 14:16 on 3/2/21   EMU Study Ended: pending discharge  Study Information:  EEG Recording Technique: The patient underwent continuous Video-EEG monitoring, using Telemetry System hardware on the XLTek Digital System. EEG and video data were stored on a computer hard drive with important events saved in digital archive files. The material was reviewed by a physician (electroencephalographer / epileptologist) on a daily basis. Donald and seizure detection algorithms were utilized and reviewed. An EEG Technician attended to the patient, and was available throughout daytime work hours.  The epilepsy center neurologist was available in person or on call 24-hours per day.  EEG Placement and Labeling of Electrodes: The EEG was performed utilizing 20 channel referential EEG connections (coronal over temporal over parasagittal montage) using all standard 10-20 electrode placements with EKG, with additional electrodes placed in the inferior temporal region using the modified 10-10 montage electrode placements for elective admissions, or if deemed necessary. Recording was at a sampling rate of 256 samples per second per channel. Time synchronized digital video recording was done simultaneously with EEG recording. A low light infrared camera was used for low light recording.     History: This is a 65 year-old  male with a history of migraine headache, anxiety, HLD, psoriasis who presents to EMU to differentiate epileptic from nonepileptic events.  First episode occurred Vines's day on . Pt was a . 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 He looked around the room, and couldn’t recall any of his colleague’s names. Felt disoriented, but still functional and able to carry out conversations. Went to [facility]. Saw Dr. Danielson. EEG at the time demonstrated focal slowing. He was started on LEV, which was eventually discontinued due to irritability.   Second episode 1-2 years later, again involving making sense of written words. He could see the words clearly, and could probably read out individual letters and even words, but he had trouble processed an entire sentence. Felt a little foggy in the head. Completed resolved in an hour.   Third episode happened 2017, in the middle of his divorce. He was talking on the phone and suddenly got confused and had difficulty forming words and producing coherence speech. Lasted about 3 hrs.  Fourth episode again occurred in school, while he was sitting in front of the computer and looking at his notes. Suddenly, he couldn’t make sense of the words, couldn’t process the words to understand the sentences.  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 Felt foggy. Presented to [facility]. CTH, CTA H/N, CTP, MRI brain were all neg.  Underwent 48hr aEEG -21, which found one possible right temp discharge. Has rare occurrence of "crinkled" vision, and had been told they were ocular migraine. "Crinkled" vision has never overlapped with these episodes of difficulty processing written words.,  SEIZURE/SPELL DESCRIPTION AND TYPE: Type #1 Severity: a form of aphasia Onset: 2012 Quality & associated signs/symptoms: difficulty processing and making sense of written sentences, foggy Duration: 1-3 hours Timing: x5 total, last 10/22/20  Modifying factors: triggered by stress Diurnal Variation: none  SEIZURE RISK FACTORS: Father has something similar (confusion, focal impaired sounds like, has them since kid), on PB. Patient was a product of normal pregnancy and delivery. No history of febrile seizure, TBI, or CNS infection.  CURRENT AED: none  PREVIOUS AED: LEV – irritability   IMAGING:  MRI brain w/o 10/22/20 ([facility]): neg  NEUROPHYSIOLOGY: aEEG - 21 (370): 1) one possible RT sharp; 2) intermittent slowing in the BT   NEUROPSYCHOLOGY:  None  Home Antiepileptic Medication and Device none  Interpretation:  Start Date: 3/1/2021 – Day 1                                Start Time – 14:16       Duration – 17hr 42m  Daily EEG Visual Analysis Findings: The background was continuous, spontaneously variable and reactive. During wakefulness, the posterior dominant rhythm consisted of symmetric, well-modulated 9-10 Hz activity, with amplitude to 30 uV, that attenuated to eye opening.   Background Slowing: No generalized background slowing was present.  Focal Slowing:  Intermittent, independent, polymorphic delta slowing in the right (max F8, F10) and left (F7/T7/F9/T9) temporal regions.  Sleep Background: Drowsiness was characterized by fragmentation, attenuation, and slowing of the background activity.   Sleep was characterized by the presence of vertex waves, symmetric sleep spindles and K-complexes.  Other Non-Epileptiform Findings: None were present.  Interictal Epileptiform Activity:  Rare right temporal (F8/F10 >P8/P10) spikes and spike-wave discharges seen in wakefulness and sleep.  BP, 7uV    AVG, 7uV    Events: No events or seizures recorded.  Artifacts: Intermittent myogenic and movement artifacts were noted.  ECG: The heart rate on single channel ECG was predominantly between 60-70 BPM.  AED: none  EEG Summary: Abnormal EEG in the awake, drowsy and asleep states. - Rare right temporal (F8/F10 >P8/P10) spikes and spike-wave discharges seen in wakefulness and sleep.  Impression/Clinical Correlate: 3/1 – 3/2: No events or seizures were recorded.  Interictal findings suggest potential epileptogenic focus in the right temporal region.  ________________________________________  Marichuy Harris MD Attending Physician, [facility] [place]  [facility] Epilepsy Monitoring Unit Report  [facility]: [address], [place] [address], Phone [phone] [facility]: [address], [place] [address], Phone [phone] [place] Office: [address], [address], [place] [address] Phone [phone]  [facility]: [address] [place] [address], Phone [phone] [place] Office: [address] [place] [address], Phone [phone]  Patient Name: Zach Dominguez   Age: 65 year, : 1955 Patient ID: -, MRN #: -, Berman: -  Physician Ordering Inpatient EEG: Selena Gong Referral Source to EMU: elective admission – Dr. Harris  EMU Study Started: 14:16 on 3/2/21   EMU Study Ended: pending discharge  Study Information:  EEG Recording Technique: The patient underwent continuous Video-EEG monitoring, using Telemetry System hardware on the XLTek Digital System. EEG and video data were stored on a computer hard drive with important events saved in digital archive files. The material was reviewed by a physician (electroencephalographer / epileptologist) on a daily basis. Donald and seizure detection algorithms were utilized and reviewed. An EEG Technician attended to the patient, and was available throughout daytime work hours.  The epilepsy center neurologist was available in person or on call 24-hours per day.  EEG Placement and Labeling of Electrodes: The EEG was performed utilizing 20 channel referential EEG connections (coronal over temporal over parasagittal montage) using all standard 10-20 electrode placements with EKG, with additional electrodes placed in the inferior temporal region using the modified 10-10 montage electrode placements for elective admissions, or if deemed necessary. Recording was at a sampling rate of 256 samples per second per channel. Time synchronized digital video recording was done simultaneously with EEG recording. A low light infrared camera was used for low light recording.     History: This is a 65 year-old  male with a history of migraine headache, anxiety, HLD, psoriasis who presents to EMU to differentiate epileptic from nonepileptic events.  First episode occurred Vines's day on . Pt was a . 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 He looked around the room, and couldn’t recall any of his colleague’s names. Felt disoriented, but still functional and able to carry out conversations. Went to [facility]. Saw Dr. Danielson. EEG at the time demonstrated focal slowing. He was started on LEV, which was eventually discontinued due to irritability.   Second episode 1-2 years later, again involving making sense of written words. He could see the words clearly, and could probably read out individual letters and even words, but he had trouble processed an entire sentence. Felt a little foggy in the head. Completed resolved in an hour.   Third episode happened 2017, in the middle of his divorce. He was talking on the phone and suddenly got confused and had difficulty forming words and producing coherence speech. Lasted about 3 hrs.  Fourth episode again occurred in school, while he was sitting in front of the computer and looking at his notes. Suddenly, he couldn’t make sense of the words, couldn’t process the words to understand the sentences.  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 Felt foggy. Presented to [facility] ER. CTH, CTA H/N, CTP, MRI brain were all neg.  Underwent 48hr aEEG -21, which found one possible right temp discharge. Has rare occurrence of "crinkled" vision, and had been told they were ocular migraine. "Crinkled" vision has never overlapped with these episodes of difficulty processing written words.,  SEIZURE/SPELL DESCRIPTION AND TYPE: Type #1 Severity: a form of aphasia Onset: 2012 Quality & associated signs/symptoms: difficulty processing and making sense of written sentences, foggy Duration: 1-3 hours Timing: x5 total, last 10/22/20  Modifying factors: triggered by stress Diurnal Variation: none  SEIZURE RISK FACTORS: Father has something similar (confusion, focal impaired sounds like, has them since kid), on PB. Patient was a product of normal pregnancy and delivery. No history of febrile seizure, TBI, or CNS infection.  CURRENT AED: none  PREVIOUS AED: LEV – irritability   IMAGING:  MRI brain w/o 10/22/20 ([facility]): neg  NEUROPHYSIOLOGY: aEEG - 21 (370): 1) one possible RT sharp; 2) intermittent slowing in the BT   NEUROPSYCHOLOGY:  None  Home Antiepileptic Medication and Device none  Interpretation:  Start Date: 3/1/2021 – Day 1                                Start Time – 14:16       Duration – 17hr 42m  Daily EEG Visual Analysis Findings: The background was continuous, spontaneously variable and reactive. During wakefulness, the posterior dominant rhythm consisted of symmetric, well-modulated 9-10 Hz activity, with amplitude to 30 uV, that attenuated to eye opening.   Background Slowing: No generalized background slowing was present.  Focal Slowing:  Intermittent, independent, polymorphic delta slowing in the right (max F8, F10) and left (F7/T7/F9/T9) temporal regions.  Sleep Background: Drowsiness was characterized by fragmentation, attenuation, and slowing of the background activity.   Sleep was characterized by the presence of vertex waves, symmetric sleep spindles and K-complexes.  Other Non-Epileptiform Findings: None were present.  Interictal Epileptiform Activity:  Rare right temporal (F8/F10 >P8/P10) spikes and spike-wave discharges seen in wakefulness and sleep.  BP, 7uV    AVG, 7uV    Events: No events or seizures recorded.  Artifacts: Intermittent myogenic and movement artifacts were noted.  ECG: The heart rate on single channel ECG was predominantly between 60-70 BPM.  AED: none  EEG Summary: Abnormal EEG in the awake, drowsy and asleep states. - Rare right temporal (F8/F10 >P8/P10) spikes and spike-wave discharges seen in wakefulness and sleep.  Impression/Clinical Correlate: 3/1 – 3/2: No events or seizures were recorded.  Interictal findings suggest potential epileptogenic focus in the right temporal region, and functional abnormality in the bitemporal regions.  ________________________________________  Marichuy Harris MD Attending Physician, [facility] [place]

## 2021-03-02 NOTE — PROGRESS NOTE ADULT - ASSESSMENT
65 year-old RH male with a history of migraine headache, anxiety, HLD, psoriasis who presents to EMU to differentiate epileptic from nonepileptic events. Personally reviewed all imagines, labs, EEG and other studies.    Right temporal spikes on EEG. Patient now diagnosed with focal epilepsy.    Recommendation:  - cvEEG   - start lamotrigine 25mg QHS (ordered)   - seizure precaution  - anticipate discharge home tomorrow on: lamotrigine 25mg QHS with plan to up-titrate outpatient (script already sent to patient's local pharmacy)   - follow-up with me in clinic: [facility] [place] ([phone]), [place] [address]       Will continue to follow with you.   ______________________  Marichuy Harris MD   [facility]  Cell: [phone]  Epilepsy Consult #: [phone] ([phone])

## 2021-03-03 ENCOUNTER — TRANSCRIPTION ENCOUNTER (OUTPATIENT)
Age: 66
End: 2021-03-03

## 2021-03-03 VITALS
RESPIRATION RATE: 18 BRPM | HEART RATE: 83 BPM | TEMPERATURE: 99 F | SYSTOLIC BLOOD PRESSURE: 120 MMHG | DIASTOLIC BLOOD PRESSURE: 75 MMHG | OXYGEN SATURATION: 95 %

## 2021-03-03 PROBLEM — L40.9 PSORIASIS, UNSPECIFIED: Chronic | Status: ACTIVE | Noted: 2021-03-01

## 2021-03-03 PROCEDURE — 83036 HEMOGLOBIN GLYCOSYLATED A1C: CPT

## 2021-03-03 PROCEDURE — 95718 EEG PHYS/QHP 2-12 HR W/VEEG: CPT

## 2021-03-03 PROCEDURE — 85027 COMPLETE CBC AUTOMATED: CPT

## 2021-03-03 PROCEDURE — 95714 VEEG EA 12-26 HR UNMNTR: CPT

## 2021-03-03 PROCEDURE — 99239 HOSP IP/OBS DSCHRG MGMT >30: CPT

## 2021-03-03 PROCEDURE — 36415 COLL VENOUS BLD VENIPUNCTURE: CPT

## 2021-03-03 PROCEDURE — 86803 HEPATITIS C AB TEST: CPT

## 2021-03-03 PROCEDURE — 86769 SARS-COV-2 COVID-19 ANTIBODY: CPT

## 2021-03-03 PROCEDURE — 80048 BASIC METABOLIC PNL TOTAL CA: CPT

## 2021-03-03 PROCEDURE — 95711 VEEG 2-12 HR UNMONITORED: CPT

## 2021-03-03 PROCEDURE — 95700 EEG CONT REC W/VID EEG TECH: CPT

## 2021-03-03 RX ORDER — LAMOTRIGINE 25 MG/1
1 TABLET, ORALLY DISINTEGRATING ORAL
Qty: 0 | Refills: 0 | DISCHARGE
Start: 2021-03-03

## 2021-03-03 NOTE — PROGRESS NOTE ADULT - SUBJECTIVE AND OBJECTIVE BOX
[facility]                                                                     Marichuy Harris MD                                              Epilepsy Consult #: [phone]-EPILEPSY ([phone])                                               Office: [address] [place] [address], [address]                                                 Phone: [phone]; Fax: [phone]                            ==============================================    EPILEPSY FOLLOW-UP NOTE      INTERVAL HISTORY:  No event or seizure overnight.     MEDICATIONS  (STANDING):  AQUAPHOR (petrolatum Ointment) 1 Application(s) Topical daily  aspirin enteric coated 81 milliGRAM(s) Oral daily  atorvastatin 20 milliGRAM(s) Oral at bedtime  calcium carbonate 1250 mG  + Vitamin D (OsCal 500 + D) 1 Tablet(s) Oral daily  enoxaparin Injectable 40 milliGRAM(s) SubCutaneous daily  lamoTRIgine 25 milliGRAM(s) Oral at bedtime  multivitamin 1 Tablet(s) Oral daily  omega-3-Acid Ethyl Esters 1 Gram(s) Oral daily    Vital Signs Last 24 Hrs  T(C): 36.6 (03 Mar 2021 04:25), Max: 36.9 (02 Mar 2021 11:21)  T(F): 97.9 (03 Mar 2021 04:25), Max: 98.5 (02 Mar 2021 11:21)  HR: 71 (03 Mar 2021 04:25) (71 - 78)  BP: 128/73 (03 Mar 2021 04:25) (128/73 - 135/73)  BP(mean): --  RR: 18 (03 Mar 2021 04:25) (18 - 20)  SpO2: 96% (03 Mar 2021 04:25) (94% - 96%)    GENERAL PHYSICAL EXAM:  GEN: no distress   HEENT: NCAT, OP clear  EYES: sclera white, conjunctiva clear, no nystagmus  NECK: supple  CV: RRR, no murmur     		  PULM: CTAB, no wheezing  ABD: soft, +BS, NT  EXT: peripheral pulse intact, no cyanosis  MSK: muscle tone and strength normal  SKIN: warm, dry, no rash or lesion on exposed skin     NEUROLOGICAL EXAM:  Mental Status  Orientation: alert and oriented to person, place, time, and situation   Language: clear and fluent, intact comprehension and repetition, intact naming and reading    Cranial Nerves  II: full visual fields intact   III, IV, VI: PERRL, EOMI  V, VII: facial sensation and movement intact and symmetric   VIII: hearing intact   IX, X: uvula midline, soft palate elevates normally   XI: BL shoulder shrug intact   XII: tongue midline    Motor  5/5 BUE and BLE                 Tone and bulk are normal in upper and lower limbs  No pronator drift    Sensation  Intact to light touch and pinprick in all 4 EXTs    Reflex  2+ in BL biceps and patella                                    Plantar responses downward bilaterally    Coordination  Normal FTN bilaterally    Gait  Deferred       LABS:                          15.1   6.83  )-----------( 339      ( 01 Mar 2021 14:37 )             45.1     03-01    141  |  103  |  11.0  ----------------------------<  102<H>  4.7   |  29.0  |  0.73    Ca    9.6      01 Mar 2021 14:37          OTHER IMAGING AND STUDIES:    EMU 3/1-3/3/21:  EEG Summary:  Abnormal EEG in the awake, drowsy and asleep states.  - Rare right temporal (F8/F10 >P8/P10) spikes and spike-wave discharges seen in wakefulness and sleep.  - Intermittent, independent, polymorphic delta slowing in the right (max F8, F10) and left (F7/T7/F9/T9) temporal regions.    Impression/Clinical Correlate:  3/1 – 3/2: No events or seizures were recorded.  3/2 – 3/3: No events or seizures were recorded.    During this EMU admission, no events or seizures were recorded. Interictal findings suggest potential epileptogenic focus in the right temporal region, and functional abnormality in the bitemporal regions.
CC: Episodes of confusion and word finding difficulty (01 Mar 2021 16:57)    HPI:  66yr old male with PMH of HPL, Psoriasis, Anxiety, migraine headaches, recent surgery for umbilical hernia 2weeks ago presented for recurring episodes of confusion and word -finding difficulty for video EEG monitoring.     INTERVAL HPI/OVERNIGHT EVENTS: Patient seen and examined sitting up in the chair.  Patient denies any headache, dizziness, SOB, CP, abdominal pain, nausea, vomiting, dysuria.  Other ROS reviewed and are negative.    Vital Signs Last 24 Hrs  T(C): 36.9 (02 Mar 2021 11:21), Max: 36.9 (02 Mar 2021 11:21)  T(F): 98.5 (02 Mar 2021 11:21), Max: 98.5 (02 Mar 2021 11:21)  HR: 76 (02 Mar 2021 11:21) (65 - 90)  BP: 135/73 (02 Mar 2021 11:21) (108/67 - 146/78)  BP(mean): --  RR: 20 (02 Mar 2021 11:21) (18 - 20)  SpO2: 96% (02 Mar 2021 11:21) (94% - 97%)  I&O's Detail      PHYSICAL EXAM:  GENERAL: NAD, well-groomed, well-developed  HEAD:  Atraumatic, Normocephalic  NECK: Supple, No JVD, Normal thyroid  NERVOUS SYSTEM:  Alert & Oriented X3, Good concentration; Motor Strength 5/5 B/L upper and lower extremities  CHEST/LUNG: Clear to auscultation bilaterally; No rales, rhonchi, wheezing, or rubs  HEART: Regular rate and rhythm; No murmurs, rubs, or gallops  ABDOMEN: Soft, Nontender, Nondistended; Bowel sounds present  EXTREMITIES:  2+ Peripheral Pulses, No clubbing, cyanosis, or edema                            15.1   6.83  )-----------( 339      ( 01 Mar 2021 14:37 )             45.1     01 Mar 2021 14:37    141    |  103    |  11.0   ----------------------------<  102    4.7     |  29.0   |  0.73     Ca    9.6        01 Mar 2021 14:37        MEDICATIONS  (STANDING):  AQUAPHOR (petrolatum Ointment) 1 Application(s) Topical daily  aspirin enteric coated 81 milliGRAM(s) Oral daily  atorvastatin 20 milliGRAM(s) Oral at bedtime  calcium carbonate 1250 mG  + Vitamin D (OsCal 500 + D) 1 Tablet(s) Oral daily  enoxaparin Injectable 40 milliGRAM(s) SubCutaneous daily  multivitamin 1 Tablet(s) Oral daily  omega-3-Acid Ethyl Esters 1 Gram(s) Oral daily    MEDICATIONS  (PRN):  LORazepam   Injectable 2 milliGRAM(s) IV Push once PRN Seizure activity  polyethylene glycol 3350 17 Gram(s) Oral daily PRN Constipation    
                                                                     [facility]                                                                     Marichuy Harris MD                                              Epilepsy Consult #: [phone]-EPILEPSY ([phone])                                               Office: [address] [place] [address], [address]                                                 Phone: [phone]; Fax: [phone]                            ==============================================    EPILEPSY FOLLOW-UP NOTE      INTERVAL HISTORY:  Right temporal spikes seen on EEG.    MEDICATIONS:   AQUAPHOR (petrolatum Ointment) 1 Application(s) Topical daily  aspirin enteric coated 81 milliGRAM(s) Oral daily  atorvastatin 20 milliGRAM(s) Oral at bedtime  calcium carbonate 1250 mG  + Vitamin D (OsCal 500 + D) 1 Tablet(s) Oral daily  enoxaparin Injectable 40 milliGRAM(s) SubCutaneous daily  lamoTRIgine 25 milliGRAM(s) Oral at bedtime  LORazepam   Injectable 2 milliGRAM(s) IV Push once PRN Seizure activity  multivitamin 1 Tablet(s) Oral daily  omega-3-Acid Ethyl Esters 1 Gram(s) Oral daily  polyethylene glycol 3350 17 Gram(s) Oral daily PRN Constipation    LAST 24-HR VITALS:  T(C): 36.9 (03-02-21 @ 11:21), Max: 36.9 (03-02-21 @ 11:21)  HR: 76 (03-02-21 @ 11:21) (65 - 90)  BP: 135/73 (03-02-21 @ 11:21) (108/67 - 146/78)  ABP: --  RR: 20 (03-02-21 @ 11:21) (18 - 20)  SpO2: 96% (03-02-21 @ 11:21) (94% - 97%)  CVP(cm H2O): --    GENERAL PHYSICAL EXAM:  GEN: no distress   HEENT: NCAT, OP clear  EYES: sclera white, conjunctiva clear, no nystagmus  NECK: supple  CV: RRR, no murmur     		  PULM: CTAB, no wheezing  ABD: soft, +BS, NT  EXT: peripheral pulse intact, no cyanosis  MSK: muscle tone and strength normal  SKIN: warm, dry, no rash or lesion on exposed skin     NEUROLOGICAL EXAM:  Mental Status  Orientation: alert and oriented to person, place, time, and situation   Language: clear and fluent, intact comprehension and repetition, intact naming and reading    Cranial Nerves  II: full visual fields intact   III, IV, VI: PERRL, EOMI  V, VII: facial sensation and movement intact and symmetric   VIII: hearing intact   IX, X: uvula midline, soft palate elevates normally   XI: BL shoulder shrug intact   XII: tongue midline    Motor  5/5 BUE and BLE                 Tone and bulk are normal in upper and lower limbs  No pronator drift    Sensation  Intact to light touch and pinprick in all 4 EXTs    Reflex  2+ in BL biceps and patella                                    Plantar responses downward bilaterally    Coordination  Normal FTN bilaterally    Gait  Deferred       LABS:                          15.1   6.83  )-----------( 339      ( 01 Mar 2021 14:37 )             45.1     03-01    141  |  103  |  11.0  ----------------------------<  102<H>  4.7   |  29.0  |  0.73    Ca    9.6      01 Mar 2021 14:37          OTHER IMAGING AND STUDIES:    U 3/1-3/2/21:  EEG Summary:  Abnormal EEG in the awake, drowsy and asleep states.  - Rare right temporal (F8/F10 >P8/P10) spikes and spike-wave discharges seen in wakefulness and sleep.    Impression/Clinical Correlate:  3/1 – 3/2: No events or seizures were recorded.    Interictal findings suggest potential epileptogenic focus in the right temporal region, and functional abnormality in the bitemporal regions.

## 2021-03-03 NOTE — DISCHARGE NOTE PROVIDER - NSDCCPCAREPLAN_GEN_ALL_CORE_FT
PRINCIPAL DISCHARGE DIAGNOSIS  Diagnosis: Seizure  Assessment and Plan of Treatment: Continue current medications as prescribed.  Follow up with primary doctor and Dr. Harris.      SECONDARY DISCHARGE DIAGNOSES  Diagnosis: Anxiety  Assessment and Plan of Treatment: Continue current medications.

## 2021-03-03 NOTE — DISCHARGE NOTE PROVIDER - NSDCMRMEDTOKEN_GEN_ALL_CORE_FT
aspirin 81 mg oral delayed release tablet: 1 tab(s) orally once a day  Calcium 600+D 600 mg-200 intl units oral tablet: 1 tab(s) orally once a day  CoQ10 300 mg oral capsule: 1 cap(s) orally once a day  Fish Oil 1000 mg oral capsule: 1 cap(s) orally once a day  glucosamine 500 mg oral capsule: 1 cap(s) orally 2 times a day  lamoTRIgine 25 mg oral tablet: 1 tab(s) orally once a day (at bedtime)  Multiple Vitamins oral tablet: 1 tab(s) orally once a day  rosuvastatin 5 mg oral tablet: 1 tab(s) orally once a day (at bedtime)

## 2021-03-03 NOTE — EEG REPORT - NS EEG TEXT BOX
[facility] Epilepsy Monitoring Unit Report  [facility]: [address], [place] [address], Phone [phone] [facility]: [address], [place] [address], Phone [phone] [place] Office: [address], [address], [place] [address] Phone [phone]  [facility]: [address] [place] [address], Phone [phone] [place] Office: [address] [place] [address], Phone [phone]  Patient Name: Zach Dominguez   Age: 65 year, : 1955 Patient ID: -, MRN #: -, Berman: -  Physician Ordering Inpatient EEG: Selena Gong Referral Source to EMU: elective admission – Dr. Harris  EMU Study Started: 14:16 on 3/2/21   EMU Study Ended: pending discharge  Study Information:  EEG Recording Technique: The patient underwent continuous Video-EEG monitoring, using Telemetry System hardware on the XLTek Digital System. EEG and video data were stored on a computer hard drive with important events saved in digital archive files. The material was reviewed by a physician (electroencephalographer / epileptologist) on a daily basis. Donald and seizure detection algorithms were utilized and reviewed. An EEG Technician attended to the patient, and was available throughout daytime work hours.  The epilepsy center neurologist was available in person or on call 24-hours per day.  EEG Placement and Labeling of Electrodes: The EEG was performed utilizing 20 channel referential EEG connections (coronal over temporal over parasagittal montage) using all standard 10-20 electrode placements with EKG, with additional electrodes placed in the inferior temporal region using the modified 10-10 montage electrode placements for elective admissions, or if deemed necessary. Recording was at a sampling rate of 256 samples per second per channel. Time synchronized digital video recording was done simultaneously with EEG recording. A low light infrared camera was used for low light recording.     History: This is a 65 year-old  male with a history of migraine headache, anxiety, HLD, psoriasis who presents to EMU to differentiate epileptic from nonepileptic events.  First episode occurred Vines's day on . Pt was a . 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 He looked around the room, and couldn’t recall any of his colleague’s names. Felt disoriented, but still functional and able to carry out conversations. Went to [facility]. Saw Dr. Danielson. EEG at the time demonstrated focal slowing. He was started on LEV, which was eventually discontinued due to irritability.   Second episode 1-2 years later, again involving making sense of written words. He could see the words clearly, and could probably read out individual letters and even words, but he had trouble processed an entire sentence. Felt a little foggy in the head. Completed resolved in an hour.   Third episode happened 2017, in the middle of his divorce. He was talking on the phone and suddenly got confused and had difficulty forming words and producing coherence speech. Lasted about 3 hrs.  Fourth episode again occurred in school, while he was sitting in front of the computer and looking at his notes. Suddenly, he couldn’t make sense of the words, couldn’t process the words to understand the sentences.  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 Felt foggy. Presented to [facility] ER. CTH, CTA H/N, CTP, MRI brain were all neg.  Underwent 48hr aEEG -21, which found one possible right temp discharge. Has rare occurrence of "crinkled" vision, and had been told they were ocular migraine. "Crinkled" vision has never overlapped with these episodes of difficulty processing written words.,  SEIZURE/SPELL DESCRIPTION AND TYPE: Type #1 Severity: a form of aphasia Onset: 2012 Quality & associated signs/symptoms: difficulty processing and making sense of written sentences, foggy Duration: 1-3 hours Timing: x5 total, last 10/22/20  Modifying factors: triggered by stress Diurnal Variation: none  SEIZURE RISK FACTORS: Father has something similar (confusion, focal impaired sounds like, has them since kid), on PB. Patient was a product of normal pregnancy and delivery. No history of febrile seizure, TBI, or CNS infection.  CURRENT AED: none  PREVIOUS AED: LEV – irritability   IMAGING:  MRI brain w/o 10/22/20 ([facility]): neg  NEUROPHYSIOLOGY: aEEG - 21 (370): 1) one possible RT sharp; 2) intermittent slowing in the BT   NEUROPSYCHOLOGY:  None  Home Antiepileptic Medication and Device none  Interpretation:  Start Date: 3/1/2021 – Day 1                                Start Time – 14:16       Duration – 17hr 42m  Daily EEG Visual Analysis Findings: The background was continuous, spontaneously variable and reactive. During wakefulness, the posterior dominant rhythm consisted of symmetric, well-modulated 9-10 Hz activity, with amplitude to 30 uV, that attenuated to eye opening.   Background Slowing: No generalized background slowing was present.  Focal Slowing:  Intermittent, independent, polymorphic delta slowing in the right (max F8, F10) and left (F7/T7/F9/T9) temporal regions.  Sleep Background: Drowsiness was characterized by fragmentation, attenuation, and slowing of the background activity.   Sleep was characterized by the presence of vertex waves, symmetric sleep spindles and K-complexes.  Other Non-Epileptiform Findings: None were present.  Interictal Epileptiform Activity:  Rare right temporal (F8/F10 >P8/P10) spikes and spike-wave discharges seen in wakefulness and sleep.  BP, 7uV    AVG, 7uV    Events: No events or seizures recorded.  Artifacts: Intermittent myogenic and movement artifacts were noted.  ECG: The heart rate on single channel ECG was predominantly between 60-70 BPM.  AED: None  Start Date: 3/2/2021 – Day 2                                       Start Time – 08:00      Duration – 24hr  Daily EEG Visual Analysis FINDINGS:  The background, artifacts and HR on single channel ECG were similar as previous day.  Interictal Epileptiform Activity:  Rare right temporal (F8/F10 >P8/P10) spikes and spike-wave discharges seen in wakefulness and sleep.  Events: No events or seizures recorded.  AED: LTG 25mg QHS  EEG Summary: Abnormal EEG in the awake, drowsy and asleep states. - Rare right temporal (F8/F10 >P8/P10) spikes and spike-wave discharges seen in wakefulness and sleep. - Intermittent, independent, polymorphic delta slowing in the right (max F8, F10) and left (F7/T7/F9/T9) temporal regions.  Impression/Clinical Correlate: 3/1 – 3/2: No events or seizures were recorded. 3/2 – 3/3: No events or seizures were recorded.  During this EMU admission, no events or seizures were recorded. Interictal findings suggest potential epileptogenic focus in the right temporal region, and functional abnormality in the bitemporal regions.  Antiepileptic medication at discharge: Start LTG 25mg daily with plan to up-titrate  ________________________________________  Marichuy Harris MD Attending Physician, [facility]

## 2021-03-03 NOTE — DISCHARGE NOTE NURSING/CASE MANAGEMENT/SOCIAL WORK - PATIENT PORTAL LINK FT
You can access the FollowMyHealth Patient Portal offered by [facility] by registering at the following website: [email or web address]. By joining [facility] network disks’s FollowMyHealth portal, you will also be able to view your health information using other applications (apps) compatible with our system.

## 2021-03-03 NOTE — DISCHARGE NOTE PROVIDER - CARE PROVIDER_API CALL
Marichuy Harris)  EEGEpilepsy; Neurology  [address] [place] [address]  Phone: [phone]  Fax: [phone]  Follow Up Time:

## 2021-03-03 NOTE — DISCHARGE NOTE PROVIDER - HOSPITAL COURSE
66yr old male with PMH of HPL, Psoriasis, Anxiety, migraine headaches, recent surgery for umbilical hernia 2weeks ago presented for recurring episodes of confusion and word -finding difficulty for video EEG monitoring. Patient completed video EEG, Lamictal started.  Patient stable for discharge to home with outpatient follow up with primary doctor and neurology.    Vital Signs Last 24 Hrs  T(C): 36.6 (03 Mar 2021 04:25), Max: 36.9 (02 Mar 2021 11:21)  T(F): 97.9 (03 Mar 2021 04:25), Max: 98.5 (02 Mar 2021 11:21)  HR: 71 (03 Mar 2021 04:25) (71 - 78)  BP: 128/73 (03 Mar 2021 04:25) (128/73 - 135/73)  BP(mean): --  RR: 18 (03 Mar 2021 04:25) (18 - 20)  SpO2: 96% (03 Mar 2021 04:25) (94% - 96%)    PHYSICAL EXAM:  GENERAL: NAD, well-groomed, well-developed  HEAD:  Atraumatic, Normocephalic  NECK: Supple, No JVD, Normal thyroid  NERVOUS SYSTEM:  Alert & Oriented X3, Good concentration; Motor Strength 5/5 B/L upper and lower extremities  CHEST/LUNG: Clear to auscultation bilaterally; No rales, rhonchi, wheezing, or rubs  HEART: Regular rate and rhythm; No murmurs, rubs, or gallops  ABDOMEN: Soft, Nontender, Nondistended; Bowel sounds present  EXTREMITIES:  2+ Peripheral Pulses, No clubbing, cyanosis, or edema       66yr old male with PMH of HPL, Psoriasis, Anxiety, migraine headaches, recent surgery for umbilical hernia 2weeks ago presented for recurring episodes of confusion and word -finding difficulty for video EEG monitoring. Patient completed video EEG, Lamictal started.  Patient stable for discharge to home with outpatient follow up with primary doctor and neurology.    Vital Signs Last 24 Hrs  T(C): 36.6 (03 Mar 2021 04:25), Max: 36.9 (02 Mar 2021 11:21)  T(F): 97.9 (03 Mar 2021 04:25), Max: 98.5 (02 Mar 2021 11:21)  HR: 71 (03 Mar 2021 04:25) (71 - 78)  BP: 128/73 (03 Mar 2021 04:25) (128/73 - 135/73)  BP(mean): --  RR: 18 (03 Mar 2021 04:25) (18 - 20)  SpO2: 96% (03 Mar 2021 04:25) (94% - 96%)    PHYSICAL EXAM:  GENERAL: NAD, well-groomed, well-developed  HEAD:  Atraumatic, Normocephalic  NECK: Supple, No JVD, Normal thyroid  NERVOUS SYSTEM:  Alert & Oriented X3, Good concentration; Motor Strength 5/5 B/L upper and lower extremities  CHEST/LUNG: Clear to auscultation bilaterally; No rales, rhonchi, wheezing, or rubs  HEART: Regular rate and rhythm; No murmurs, rubs, or gallops  ABDOMEN: Soft, Nontender, Nondistended; Bowel sounds present  EXTREMITIES:  2+ Peripheral Pulses, No clubbing, cyanosis, or edema      Time spent in discharge planning and co-ordination : 37min

## 2021-03-03 NOTE — PROGRESS NOTE ADULT - ASSESSMENT
65 year-old RH male with a history of migraine headache, anxiety, HLD, psoriasis who presents to EMU to differentiate epileptic from nonepileptic events. Personally reviewed all imagines, labs, EEG and other studies.    Right temporal spikes on EEG. Patient now diagnosed with focal epilepsy.    Recommendation:  - DC cvEEG   - continue lamotrigine 25mg QHS   - seizure precaution  - anticipate discharge home today on: lamotrigine 25mg QHS with plan to up-titrate outpatient (script already sent to patient's local pharmacy)   - follow-up with me in clinic: [facility] [place] ([phone]), [address] [place] [address]       Please contact Epilepsy with further question if needed.   ______________________  Marichuy Harris MD   [facility]  Cell: [phone]  Epilepsy Consult #: 83-EPILEPSY ([phone])

## 2021-03-03 NOTE — PROGRESS NOTE ADULT - REASON FOR ADMISSION
Episodes of confusion and word finding difficulty

## 2021-03-04 LAB
SARS-COV-2 IGG SERPL QL IA: NEGATIVE — SIGNIFICANT CHANGE UP
SARS-COV-2 IGM SERPL IA-ACNC: <3.8 AU/ML — SIGNIFICANT CHANGE UP

## 2021-03-17 ENCOUNTER — NON-APPOINTMENT (OUTPATIENT)
Age: 66
End: 2021-03-17

## 2021-03-17 RX ORDER — LAMOTRIGINE 25 MG/1
25 TABLET ORAL
Qty: 200 | Refills: 1 | Status: COMPLETED | COMMUNITY
Start: 2021-03-01 | End: 2021-03-17

## 2021-04-06 ENCOUNTER — APPOINTMENT (OUTPATIENT)
Dept: NEUROLOGY | Facility: CLINIC | Age: 66
End: 2021-04-06
Payer: COMMERCIAL

## 2021-04-06 VITALS
BODY MASS INDEX: 28.63 KG/M2 | SYSTOLIC BLOOD PRESSURE: 124 MMHG | TEMPERATURE: 98.2 F | HEIGHT: 70 IN | OXYGEN SATURATION: 97 % | WEIGHT: 200 LBS | HEART RATE: 83 BPM | DIASTOLIC BLOOD PRESSURE: 72 MMHG

## 2021-04-06 PROCEDURE — 99214 OFFICE O/P EST MOD 30 MIN: CPT

## 2021-04-06 PROCEDURE — 99072 ADDL SUPL MATRL&STAF TM PHE: CPT

## 2021-04-06 NOTE — ASSESSMENT
[FreeTextEntry1] : QUYNH SAMSON is a 65 year-old RH male with a history of migraine headache, anxiety, HLD, psoriasis who presents for followup for focal epilepsy characterized by focal sz with various levels of awareness. MRI unremarkable. EEG with RT spikes. Personally reviewed all images, labs and other studies. \par \par Doing well on up-titration of LTG. All questions and concerns answered and addressed in detail to patient's complete satisfaction. Patient verbalized understanding and agreed to plan.\par \par \par - Start LTG. Following titration schedule printed and provided to pt. Educated pt on side effects, including monitoring for rash daily. Stop taking LTG and call office immediately if rash appears. \par Weeks 1,2 - LTG ER 25mg QD\par Weeks 3,4 - LTG ER 50mg QD (currently here)\par Weeks 5,6 - LTG ER 100mg QD\par Weeks 7,8 - LTG ER 150mg QD\par Weeks 9 and beyond - LTG ER 200mg QD\par \par - f/u in 4 months\par \par \par All relevant epilepsy AAN quality care measures were addressed and discussed with the patient.\par \par More than 50% of time spent counseling and educating patient about epilepsy specific safety issues including AED side effects and interactions, alcohol consumption, sleep deprivation, risks and driving privileges associated with the [place] Guidelines, death related to seizures/SUDEP, seizure 1st aid and risks. Patient is educated on seizure precautions, including instruction not to do following after a breakthrough focal impaired sz - no driving, no operating machinery, no swimming or bathing, no climbing heights, or engage in any risky activities during which a seizure could cause further injury to pt or others.

## 2021-04-06 NOTE — CONSULT LETTER
[Dear  ___] : Dear  [unfilled], [Sincerely,] : Sincerely, [FreeTextEntry1] : I had the pleasure of seeing your patient, QUYNH SAMSON, in my office today. Please see my note below. \par \par If you have any questions, please do not hesitate to contact me.  [FreeTextEntry3] : Marichuy Harris MD\par  of Neurology\par [facility]\par [facility]\par [facility] [place]\par [address] [place] [address]\par Phone: [phone]; Fax: [phone]\par \par

## 2021-04-06 NOTE — HISTORY OF PRESENT ILLNESS
[FreeTextEntry1] : LAST OFFICE VISIT: 2/10/21\par \par PCP: Dr. Ladinsky\par \par INTERIM HISTORY:\par Completed EMU 3/2 – 3/3/21 and diagnosed with focal epilepsy, with rare RT spikes/SW on EEG. Started on LTG titrating, and now on 50mg daily. Doing well.\par \par CURRENT AED:\par LTG ER 50mg daily (up-titration) – started 3/3/21\par \par \par PRIOR EPILEPSY HISTORY:\par 2/10/21: This is a 65 year-old RH male with a history of migraine headache, anxiety, HLD, psoriasis who is referred by ELLYN HUSSEIN,VERONICA SEPULVEDA for evaluation and management of seizure-like activity. \par \par First episode occurred Valentine's day on 2012. Pt was a . 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 He looked around the room, and couldn’t recall any of his colleague’s names. Felt disoriented, but still functional and able to carry out conversations. Went to [facility]. Saw Dr. Danielson. EEG at the time demonstrated focal slowing. He was started on LEV, which was eventually discontinued due to irritability.  \par \par Second episode 1-2 years later, again involving making sense of written words. He could see the words clearly, and could probably read out individual letters and even words, but he had trouble processed an entire sentence. Felt a little foggy in the head. Completed resolved in an hour. \par \par Third episode happened 7/2017, in the middle of his divorce. He was talking on the phone and suddenly got confused and had difficulty forming words and producing coherence speech. Lasted about 3 hrs.\par \par Fourth episode again occurred in school, while he was sitting in front of the computer and looking at his notes. 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 Felt foggy. Presented to [facility] ER. CTH, CTA H/N, CTP, MRI brain were all neg.\par \par Underwent 48hr aEEG 2/2-2/4/21, which found one possible right temp discharge. 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 “Crinkled” vision has never overlapped with these episodes of difficulty processing written words. CURRENT AED: None.\par \par \par SEIZURE DESCRIPTION AND TYPE:\par Type #1\par Severity: a form of aphasia\par Onset: 2/2012\par Quality & associated signs/symptoms: difficulty processing and making sense of written sentences, foggy\par Duration: 1-3 hours\par Timing: x5 total, last 10/22/20 \par Modifying factors: triggered by stress\par Diurnal Variation: none\par \par EPILEPSY TYPE: focal epilepsy\par HISTORY OF TONIC-CLONIC SZ: no\par HISTORY OF STATUS EPILEPTICUS: unclear if he had focal status\par \par SEIZURE RISK FACTORS:\par Father has something similar (confusion, focal impaired sounds like, has them since kid), on PB. Patient was a product of normal pregnancy and delivery. No history of febrile seizure, TBI, or CNS infection.\par \par PREVIOUS AED:\par LEV – irritability \par \par IMAGING: \par MRI brain w/o 10/22/20 ([facility]): neg\par \par NEUROPHYSIOLOGY:\par EMU 3/2 – 3/3/21 ([facility]): 1) rare RT spikes/SW, 2) intermittent, independent BT slowing\par \par 48hr aEEG 2/2- 2/4/21 ([facility]): 1) one possible RT sharp; 2) intermittent slowing in the BT \par \par NEUROPSYCHOLOGY: \par none\par \par SH:\par Retired . .

## 2021-05-02 ENCOUNTER — RESULT REVIEW (OUTPATIENT)
Age: 66
End: 2021-05-02

## 2021-05-03 ENCOUNTER — APPOINTMENT (OUTPATIENT)
Dept: DERMATOLOGY | Facility: CLINIC | Age: 66
End: 2021-05-03
Payer: COMMERCIAL

## 2021-05-03 PROCEDURE — 99072 ADDL SUPL MATRL&STAF TM PHE: CPT

## 2021-05-03 PROCEDURE — 99214 OFFICE O/P EST MOD 30 MIN: CPT | Mod: 25

## 2021-05-03 PROCEDURE — 17110 DESTRUCTION B9 LES UP TO 14: CPT

## 2021-05-05 ENCOUNTER — TRANSCRIPTION ENCOUNTER (OUTPATIENT)
Age: 66
End: 2021-05-05

## 2021-05-24 ENCOUNTER — APPOINTMENT (OUTPATIENT)
Dept: NEUROLOGY | Facility: CLINIC | Age: 66
End: 2021-05-24

## 2021-06-01 ENCOUNTER — APPOINTMENT (OUTPATIENT)
Dept: DERMATOLOGY | Facility: CLINIC | Age: 66
End: 2021-06-01
Payer: COMMERCIAL

## 2021-06-01 PROCEDURE — 99072 ADDL SUPL MATRL&STAF TM PHE: CPT

## 2021-06-01 PROCEDURE — 99212 OFFICE O/P EST SF 10 MIN: CPT | Mod: 25

## 2021-06-01 PROCEDURE — 17110 DESTRUCTION B9 LES UP TO 14: CPT

## 2021-07-12 ENCOUNTER — APPOINTMENT (OUTPATIENT)
Dept: NEUROLOGY | Facility: CLINIC | Age: 66
End: 2021-07-12
Payer: COMMERCIAL

## 2021-07-12 VITALS
SYSTOLIC BLOOD PRESSURE: 124 MMHG | DIASTOLIC BLOOD PRESSURE: 71 MMHG | HEIGHT: 70 IN | WEIGHT: 195 LBS | BODY MASS INDEX: 27.92 KG/M2 | HEART RATE: 80 BPM | OXYGEN SATURATION: 98 % | TEMPERATURE: 98.5 F

## 2021-07-12 PROCEDURE — 99214 OFFICE O/P EST MOD 30 MIN: CPT

## 2021-07-12 PROCEDURE — 99072 ADDL SUPL MATRL&STAF TM PHE: CPT

## 2021-07-14 ENCOUNTER — APPOINTMENT (OUTPATIENT)
Dept: DERMATOLOGY | Facility: CLINIC | Age: 66
End: 2021-07-14
Payer: COMMERCIAL

## 2021-07-14 PROCEDURE — 17110 DESTRUCTION B9 LES UP TO 14: CPT

## 2021-07-14 PROCEDURE — 99072 ADDL SUPL MATRL&STAF TM PHE: CPT

## 2021-07-14 PROCEDURE — 99213 OFFICE O/P EST LOW 20 MIN: CPT | Mod: 25

## 2021-07-16 NOTE — HISTORY OF PRESENT ILLNESS
[FreeTextEntry1] : LAST OFFICE VISIT: 4/6/21\par \par PCP: Dr. Ladinsky\par \par INTERIM HISTORY:\par Insomnia with taking LTG ER in the evening, which was alleviated by taking it in the morning. No sz. Doing well.\par \par CURRENT AED:\par LTG ER 200mg daily – started 3/3/21\par \par \par PRIOR EPILEPSY HISTORY:\par 2/10/21: This is a 65 year-old RH male with a history of migraine headache, anxiety, HLD, psoriasis who is referred by ELLYN HUSSEIN,VERONICA SEPULVEDA for evaluation and management of seizure-like activity. \par \par First episode occurred Valentine's day on 2012. Pt was a . 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 He looked around the room, and couldn’t recall any of his colleague’s names. Felt disoriented, but still functional and able to carry out conversations. Went to [facility]. Saw Dr. Danielson. EEG at the time demonstrated focal slowing. He was started on LEV, which was eventually discontinued due to irritability.  \par \par Second episode 1-2 years later, again involving making sense of written words. He could see the words clearly, and could probably read out individual letters and even words, but he had trouble processed an entire sentence. Felt a little foggy in the head. Completed resolved in an hour. \par \par Third episode happened 7/2017, in the middle of his divorce. He was talking on the phone and suddenly got confused and had difficulty forming words and producing coherence speech. Lasted about 3 hrs.\par \par Fourth episode again occurred in school, while he was sitting in front of the computer and looking at his notes. 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 Felt foggy. Presented to [facility] ER. CTH, CTA H/N, CTP, MRI brain were all neg.\par \par Underwent 48hr aEEG 2/2-2/4/21, which found one possible right temp discharge. 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 “Crinkled” vision has never overlapped with these episodes of difficulty processing written words. 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 Started on LTG titrating, and now on 50mg daily. Doing well. CURRENT AED: LTG ER 50mg daily (up-titration) – started 3/3/21.\par \par \par SEIZURE DESCRIPTION AND TYPE:\par Type #1\par Severity: a form of aphasia\par Onset: 2/2012\par Quality & associated signs/symptoms: difficulty processing and making sense of written sentences, foggy\par Duration: 1-3 hours\par Timing: x5 total, last 10/22/20 \par Modifying factors: triggered by stress\par Diurnal Variation: none\par \par EPILEPSY TYPE: focal epilepsy\par HISTORY OF TONIC-CLONIC SZ: no\par HISTORY OF STATUS EPILEPTICUS: unclear if he had focal status\par \par SEIZURE RISK FACTORS:\par Father has something similar (confusion, focal impaired sounds like, has them since kid), on PB. Patient was a product of normal pregnancy and delivery. No history of febrile seizure, TBI, or CNS infection.\par \par PREVIOUS AED:\par LEV – irritability \par \par IMAGING: \par MRI brain w/o 10/22/20 ([facility]): neg\par \par NEUROPHYSIOLOGY:\par EMU 3/2 – 3/3/21 ([facility]): 1) rare RT spikes/SW, 2) intermittent, independent BT slowing\par \par 48hr aEEG 2/2- 2/4/21 ([facility]): 1) one possible RT sharp; 2) intermittent slowing in the BT \par \par NEUROPSYCHOLOGY: \par none

## 2021-07-16 NOTE — ASSESSMENT
[FreeTextEntry1] : QUYNH SAMSON is a 65 year-old RH male with a history of migraine headache, anxiety, HLD, psoriasis who presents for followup for focal epilepsy characterized by focal sz with various levels of awareness. MRI unremarkable. EEG with RT spikes. Personally reviewed all images, labs and other studies. \par \par Doing well on LTG monotherapy. All questions and concerns answered and addressed in detail to patient's complete satisfaction. Patient verbalized understanding and agreed to plan.\par \par \par - continue LTG ER 200mg daily\par - f/u in 4 months\par \par \par All relevant epilepsy AAN quality care measures were addressed and discussed with the patient.\par \par More than 50% of time spent counseling and educating patient about epilepsy specific safety issues including AED side effects and interactions, alcohol consumption, sleep deprivation, risks and driving privileges associated with the [place] Guidelines, death related to seizures/SUDEP, seizure 1st aid and risks. Patient is educated on seizure precautions, including instruction not to do following after a breakthrough focal impaired sz - no driving, no operating machinery, no swimming or bathing, no climbing heights, or engage in any risky activities during which a seizure could cause further injury to pt or others.

## 2021-08-12 ENCOUNTER — RX RENEWAL (OUTPATIENT)
Age: 66
End: 2021-08-12

## 2021-10-14 NOTE — H&P ADULT - NSHPSOCIALHISTORY_GEN_ALL_CORE
The patient went to x ray with the tech.                     Christiane Mann RN  10/14/21 1048 denies smoking  Alcohol - social use

## 2021-11-09 ENCOUNTER — APPOINTMENT (OUTPATIENT)
Dept: ORTHOPEDIC SURGERY | Facility: CLINIC | Age: 66
End: 2021-11-09
Payer: MEDICARE

## 2021-11-09 VITALS
BODY MASS INDEX: 28.63 KG/M2 | WEIGHT: 200 LBS | HEIGHT: 70 IN | DIASTOLIC BLOOD PRESSURE: 75 MMHG | HEART RATE: 84 BPM | SYSTOLIC BLOOD PRESSURE: 135 MMHG

## 2021-11-09 DIAGNOSIS — M41.80 OTHER FORMS OF SCOLIOSIS, SITE UNSPECIFIED: ICD-10-CM

## 2021-11-09 DIAGNOSIS — Z87.39 PERSONAL HISTORY OF OTHER DISEASES OF THE MUSCULOSKELETAL SYSTEM AND CONNECTIVE TISSUE: ICD-10-CM

## 2021-11-09 DIAGNOSIS — F41.9 ANXIETY DISORDER, UNSPECIFIED: ICD-10-CM

## 2021-11-09 PROCEDURE — 99213 OFFICE O/P EST LOW 20 MIN: CPT

## 2021-11-09 PROCEDURE — 72100 X-RAY EXAM L-S SPINE 2/3 VWS: CPT

## 2021-11-09 NOTE — HISTORY OF PRESENT ILLNESS
[de-identified] : 66 year old M Presents for follow up evaluation of an acute exacerbation of chronic neck pain. He has some left anterior thigh burning as well. He has an appointment with physiatry for knee injections. He states he recently took a road trip with worsening pain and wanted to ensure there is no change.  [Ataxia] : no ataxia [Incontinence] : no incontinence [Loss of Dexterity] : good dexterity [Urinary Ret.] : no urinary retention

## 2021-11-09 NOTE — DISCUSSION/SUMMARY
[de-identified] : We talked about the nature of the condition and treatment options. Anticipatory guidance regarding rotoscoliosis was given. Patient declined a PT referral as he does yoga daily. Patient to follow up on a PRN basis. \par \par Prior to appointment and during encounter with patient extensive medical records were reviewed including but not limited to, hospital records, out patient records, imaging results, and lab data. During this appointment the patient was examined, diagnoses were discussed and explained in a face to face manner. In addition extensive time was spent reviewing aforementioned diagnostic studies. Counseling including abnormal image results, differential diagnoses, treatment options, risk and benefits, lifestyle changes, current condition, and current medications was performed. Patient's comments, questions, and concerns were address and patient verbalized understanding. Based on this patient's presentation at our office, which is an orthopedic spine surgeon's office, this patient inherently / intrinsically has a risk, however minute, of developing  issues such as Cauda equina syndrome, bowel and bladder changes, or progression of motor or neurological deficits such as paralysis which may be permanent.

## 2021-11-09 NOTE — PHYSICAL EXAM
[Poor Appearance] : well-appearing [Acute Distress] : not in acute distress [de-identified] : CONSTITUTIONAL: The patient is a very pleasant individual who is well-nourished and who appears stated age.\par PSYCHIATRIC: The patient is alert and oriented X 3 and in no apparent distress, and participates with orthopedic evaluation well.\par HEAD: Atraumatic and is nonsyndromic in appearance.\par EENT: No visible thyromegaly, EOMI.\par RESPIRATORY: Respiratory rate is regular, not dyspneic on examination.\par LYMPHATICS: There is no inguinal lymphadenopathy\par INTEGUMENTARY: Skin is clean, dry, and intact about the bilateral lower extremities and lumbar spine.\par VASCULAR: There is brisk capillary refill about the bilateral lower extremities.\par NEUROLOGIC: There are no pathologic reflexes. There is no decrease in sensation of the bilateral lower extremities on Wartenberg pinwheel examination. Deep tendon reflexes are well maintained at 2+/4 of the bilateral lower extremities and are symmetric.\par MUSCULOSKELETAL: There is no visible muscular atrophy. Manual motor strength is well maintained in the bilateral lower extremities. Range of motion of lumbar spine is well maintained. The patient ambulates in a non-myelopathic manner. Negative tension sign and straight leg raise bilaterally. Quad extension, ankle dorsiflexion, EHL, plantar flexion, and ankle eversion are well preserved. Normal secondary orthopaedic exam of bilateral hips, greater trochanteric area, knees and ankles. No pain with internal or external rotation of the bilateral hips.  [de-identified] : Xray of the lumbar spine taken 10/16/2020 demonstrates degenerative Rotoscoliosis and disc space asymmetry at L4 and L5.  \par \par Xray of the lumbar spine taken 11/09/2021 demonstrates unchanged rotoscoliosis, with disc space asymptomatic at L4-L5. \par \par MRI of the cervical spine taken at [facility] on 11- demonstrates moderate canal stenosis at L3-L4 and L4-L5.

## 2021-11-17 ENCOUNTER — APPOINTMENT (OUTPATIENT)
Dept: PHYSICAL MEDICINE AND REHAB | Facility: CLINIC | Age: 66
End: 2021-11-17
Payer: MEDICARE

## 2021-11-17 VITALS
HEIGHT: 70 IN | DIASTOLIC BLOOD PRESSURE: 68 MMHG | WEIGHT: 200 LBS | HEART RATE: 85 BPM | BODY MASS INDEX: 28.63 KG/M2 | RESPIRATION RATE: 14 BRPM | SYSTOLIC BLOOD PRESSURE: 136 MMHG

## 2021-11-17 DIAGNOSIS — M54.16 RADICULOPATHY, LUMBAR REGION: ICD-10-CM

## 2021-11-17 DIAGNOSIS — Z78.9 OTHER SPECIFIED HEALTH STATUS: ICD-10-CM

## 2021-11-17 DIAGNOSIS — G40.909 EPILEPSY, UNSPECIFIED, NOT INTRACTABLE, W/OUT STATUS EPILEPTICUS: ICD-10-CM

## 2021-11-17 DIAGNOSIS — M47.816 SPONDYLOSIS W/OUT MYELOPATHY OR RADICULOPATHY, LUMBAR REGION: ICD-10-CM

## 2021-11-17 DIAGNOSIS — M48.061 SPINAL STENOSIS, LUMBAR REGION WITHOUT NEUROGENIC CLAUDICATION: ICD-10-CM

## 2021-11-17 PROCEDURE — 99204 OFFICE O/P NEW MOD 45 MIN: CPT

## 2021-11-17 RX ORDER — ASPIRIN ENTERIC COATED TABLETS 81 MG 81 MG/1
81 TABLET, DELAYED RELEASE ORAL DAILY
Qty: 30 | Refills: 3 | Status: COMPLETED | COMMUNITY
Start: 2020-11-05 | End: 2021-11-17

## 2021-11-17 RX ORDER — METHYLPREDNISOLONE 4 MG/1
4 TABLET ORAL
Qty: 1 | Refills: 0 | Status: ACTIVE | COMMUNITY
Start: 2021-11-17 | End: 1900-01-01

## 2021-11-17 RX ORDER — DICLOFENAC SODIUM 75 MG/1
75 TABLET, DELAYED RELEASE ORAL
Refills: 0 | Status: ACTIVE | COMMUNITY

## 2021-11-17 NOTE — PHYSICAL EXAM
[FreeTextEntry1] : PE:\par Constitutional: In NAD, calm and cooperative\par HEENT: NCAT, Anicteric sclera, no lid-lag\par Cardio: Extremities appear pink and well perfused, no peripheral edema\par Respiratory: Normal respiratory effort on room air, no accessory muscle use\par Skin: no rashes seen on exposed skin, no visible abrasions\par Psych: Normal affect, intact judgment and insight\par Neuro: Awake, alert and oriented x 3, see below for focused neurological exam\par MSK (Back)\par 	Inspection: no gross swelling identified\par 	Palpation: Minimal enderness of the bilateral lower lumbar paraspinals\par 	ROM: Pain at end lumbar extension, no pain with flexion\par 	Strength: 5/5 strength in bilateral lower extremities\par 	Reflexes: 2+ Patella reflex bilaterally, 2+ Achilles reflex bilaterally, negative clonus bilaterally\par 	Sensation: Intact to light touch in bilateral lower extremities\par Special tests:\par SLR:negative bilaterally\par CHUCK:negative bilaterally\par FADIR: negative bilaterally

## 2021-11-17 NOTE — END OF VISIT
[FreeTextEntry3] : I have personally seen, examined, and participated in the care of this patient. I was physically present for key portions of the evaluation and management service provided and I have reviewed all pertinent clinical information.  I agree with the resident's history, physical exam, and plan which I reviewed and edited where appropriate.

## 2021-11-17 NOTE — ASSESSMENT
[FreeTextEntry1] : Mr. QUYNH SAMSON is a 66 year old male who presents with chronic episodic low back pain likely due to his underlying spondylosis/stenosis. Denies any red flag signs. Will recommend:\par - Medrol dose deepti Rx given, to take as recommended if pain becomes severe. Patient advised on potential side effects including but not limited to increased risk of elevated blood sugar, blood pressure, and infection. Patient encouraged to take medication with food and not with NSAIDs. \par - PT 2-3x/week for stretching, strengthening (especially of core muscles), ROM exercises, HEP and modalities PRN including myofascial release, moist heat, and TENS therapy \par - Patient encouraged to continue Yoga\par \par RTC in 4-5 weeks. Patient aware of red flag signs including any changes to their bowel/bladder control, groin numbness or new weakness. Patient knows to seek immediate attention by calling 911 or going to nearest ER if these symptoms appear.

## 2021-11-17 NOTE — HISTORY OF PRESENT ILLNESS
[FreeTextEntry1] : Mr. QUYNH SAMSON is a 66 year old male who presents with low back pain. \par \par Location:Across low back\par Onset:Chronic\par Provocation/Palliative:Worse with certain activities, better with rest\par Quality:Achy/Spasm like, but can be burning\par Radiation:Can radiate to bilateral buttocks and to L anterior thigh\par Severity:1/10 today, can be 8-9/10\par Timing:episodic, comes and goes\par \par Denies any associated numbness. Denies any associated leg weakness. Denies any loss of bowel/bladder control or any groin numbness.\par Previous medications trialed:Cyclobenzaprine, Diclofenac (not too much help), Medrol dose deepti (did help significantly)\par Previous procedures relevant to complaint:None\par Conservative therapy tried?:Does Yoga daily for 30-40 years

## 2021-11-17 NOTE — DATA REVIEWED
[FreeTextEntry1] : 					\par Exam requested by:\par MICHAEL LADINSKY DO\par [address]\par [facility] [address]\par SITE PERFORMED: [facility]\par Patient: QUYNH SAMSNO\par YOB: 1955\par Phone: [phone]\par MRN: [id number] Acc: [id number]\par Date of Exam: 11-\par  \par EXAM:  MRI LUMBAR SPINE WITHOUT CONTRAST\par \par HISTORY:  Low back pain with radiculopathy.\par \par TECHNIQUE: Multiplanar, multisequence MRI of the lumbar spine was performed utilizing standard protocol.\par \par COMPARISON:  None available.\par \par FINDINGS:\par There is no evidence of acute fracture or malalignment. There is about 21 degrees of convex left lumbar scoliosis. There is minimal discogenic bone marrow edema within the L4-5 vertebral endplates on the left and L1-2 vertebral endplates on the right. There is no suspect osseous lesion or focal bone marrow signal abnormality.\par \par The conus medullaris is normal in size, position, and signal intensity, terminating at the level of L1. The lumbar nerve roots are unremarkable. There is no intraspinal mass lesion identified.\par \par There is diffuse desiccation of the lumbar intervertebral discs, with mild multilevel disc space narrowing. There is multilevel endplate hypertrophic change.\par \par At T12-L1, there is no evidence of disc bulge or herniation. There is no spinal canal or foraminal stenosis.\par \par At L1-2, there is a mild disc bulge which indents the ventral thecal sac. There is no spinal canal or foraminal stenosis.\par \par At L2-3, there is a mild disc bulge and broad-based left foraminal herniation with annular fissure and asymmetric right-sided ligamentum flavum/facet joint hypertrophy resulting in mild central canal and right lateral recess and foraminal stenosis and mild to moderate left foraminal stenosis with left L2 nerve root impingement.\par \par At L3-4, there is a mild to moderate disc bulge which indents the ventral thecal sac, with mild ligamentum flavum and facet joint hypertrophy resulting in mild to moderate central canal and bilateral foraminal stenosis.\par \par At L4-5, there is a left eccentric bulging disc-osteophyte complex along with mild asymmetric left-sided facet joint hypertrophy resulting in mild to moderate central canal, moderate left lateral recess, mild to moderate right foraminal, and moderate to marked left foraminal stenosis with left L4 nerve root impingement.\par \par At L5-S1, there is a left eccentric bulging disc-osteophyte complex which indents the ventral thecal sac and mildly narrows the left lateral recess and left neural foramen.\par \par The paraspinal soft tissues are unremarkable.\par \par IMPRESSION: \par 1. Mild multilevel degenerative disc disease and spondylosis with mild to moderate lumbar levoscoliosis.\par 2. Mild disc bulge at L2-3 with broad-based left foraminal herniation and annular fissure along with asymmetric right-sided ligamentum flavum/facet joint hypertrophy resulting in mild central canal and right lateral recess and foraminal stenosis and mild to moderate left foraminal stenosis with left L2 nerve root impingement.\par 3. Mild to moderate disc bulge at L3-4 with mild to moderate central canal and bilateral foraminal stenosis.\par 4. Left eccentric bulging disc-osteophyte complex at L4-5 with mild to moderate central canal, moderate left lateral recess, mild to moderate right foraminal, and moderate to marked left foraminal stenosis with left L4 nerve root impingement.\par 5. Left eccentric bulging disc-osteophyte complex at L5-S1 with mild left lateral recess and foraminal stenosis.\par \par Thank you for the opportunity to participate in the care of this patient.  \par  \par MOHSEN SALES DO  - Electronically Signed: 11- 9:45 AM \par Physician to Physician Direct Line is: [phone]\par Copy to:JOSHUA JACOB DO\par

## 2022-08-15 ENCOUNTER — APPOINTMENT (OUTPATIENT)
Dept: NEUROLOGY | Facility: CLINIC | Age: 67
End: 2022-08-15

## 2022-08-15 VITALS
HEART RATE: 83 BPM | DIASTOLIC BLOOD PRESSURE: 64 MMHG | SYSTOLIC BLOOD PRESSURE: 112 MMHG | HEIGHT: 70 IN | WEIGHT: 195 LBS | BODY MASS INDEX: 27.92 KG/M2 | OXYGEN SATURATION: 95 %

## 2022-08-15 PROCEDURE — 99214 OFFICE O/P EST MOD 30 MIN: CPT

## 2022-08-15 NOTE — PHYSICAL EXAM
[FreeTextEntry1] : GENERAL PHYSICAL EXAM:\par GEN: no distress, normal affect\par HEENT: NCAT, OP clear\par EYES: sclera white, conjunctiva clear, no nystagmus\par NECK: supple\par CV: RRR    		\par PULM: CTAB, no wheezing\par GI: soft ABD, +BS, NT, ND\par EXT: peripheral pulse intact, no cyanosis\par MSK: muscle tone and strength normal\par SKIN: warm, dry, no rash or lesion on exposed skin \par \par NEUROLOGICAL EXAM:\par Mental Status\par Orientation: alert and oriented to person, place, time, and situation \par Language: clear and fluent\par \par Cranial Nerves\par II: full visual fields intact \par III, IV, VI: PERRL, EOMI\par V, VII: facial sensation and movement intact and symmetric \par VIII: hearing intact \par IX, X: uvula midline, soft palate elevates normally \par XI: BL shoulder shrug intact \par XII: tongue midline\par \par Motor\par Shoulder abd: 5 (R), 5 (L)\par EF/EE: 5 (R), 5 (L)\par WF/WE: 5 (R), 5 (L)\par hand : 5 (R), 5 (L)\par HF/HE: 5 (R), 5 (L)\par KF/KE: 5 (R), 5 (L)\par DF/PF: 5 (R), 5 (L)                \par Tone and bulk are normal in upper and lower limbs\par No pronator drift\par \par Sensation\par Intact to light touch and pinprick in all 4 EXTs\par \par Reflex\par 2+ in BL biceps, triceps, brachioradialis, patella, ankle                                    \par Plantar responses downward bilaterally\par \par Coordination\par Normal FTN bilaterally\par \par Gait\par Normal stance, stride, and pivot turn\par Tandem walk intact\par Negative Romberg\par

## 2022-08-15 NOTE — ASSESSMENT
[FreeTextEntry1] : QUYNH SAMSON is a 65 year-old RH male with a history of migraine headache, anxiety, HLD, psoriasis who presents for followup for focal epilepsy characterized by focal sz with various levels of awareness. MRI unremarkable. EEG with RT spikes. Personally reviewed all images, labs and other studies. \par \par Doing well on LTG monotherapy. All questions and concerns answered and addressed in detail to patient's complete satisfaction. Patient verbalized understanding and agreed to plan.\par \par - continue LTG ER 200mg daily\par - f/u in 6 months\par \par \par All relevant epilepsy AAN quality care measures were addressed and discussed with the patient.\par \par More than 50% of time spent counseling and educating patient about epilepsy specific safety issues including AED side effects and interactions, alcohol consumption, sleep deprivation, risks and driving privileges associated with the [facility] [place] Guidelines, death related to seizures/SUDEP, seizure 1st aid and risks. Patient is educated on seizure precautions, including instruction not to do following after a breakthrough focal impaired sz - no driving, no operating machinery, no swimming or bathing, no climbing heights, or engage in any risky activities during which a seizure could cause further injury to pt or others.

## 2022-08-15 NOTE — HISTORY OF PRESENT ILLNESS
[FreeTextEntry1] : LAST OFFICE VISIT: 7/12/21\par \par PCP: Dr. Ladinsky\par \par INTERIM HISTORY:\par Enjoying snf. Remains sz free.\par \par CURRENT ASM:\par LTG ER 200mg daily – started 3/3/21, level 4.2 on 7/14/21\par \par \par PRIOR HISTORY:\par 2/10/21: This is a 65 year-old RH male with a history of migraine headache, anxiety, HLD, psoriasis who is referred by ELLYN HUSSEIN,VERONICA SEPULVEDA for evaluation and management of seizure-like activity. \par \par First episode occurred Valentine's day on 2012. Pt was a . 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 He looked around the room, and couldn’t recall any of his colleague’s names. Felt disoriented, but still functional and able to carry out conversations. Went to [facility]. Saw Dr. Danielson. EEG at the time demonstrated focal slowing. He was started on LEV, which was eventually discontinued due to irritability.  \par \par Second episode 1-2 years later, again involving making sense of written words. He could see the words clearly, and could probably read out individual letters and even words, but he had trouble processed an entire sentence. Felt a little foggy in the head. Completed resolved in an hour. \par \par Third episode happened 7/2017, in the middle of his divorce. He was talking on the phone and suddenly got confused and had difficulty forming words and producing coherence speech. 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 Felt foggy. Presented to [facility] ER. CTH, CTA H/N, CTP, MRI brain were all neg.\par \par Underwent 48hr aEEG 2/2-2/4/21, which found one possible right temp discharge. 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 “Crinkled” vision has never overlapped with these episodes of difficulty processing written words. 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 Started on LTG titrating, and now on 50mg daily. Doing well. CURRENT AED: LTG ER 50mg daily (up-titration) – started 3/3/21.\par \par === 7/12/21 ===\par Insomnia with taking LTG ER in the evening, which was alleviated by taking it in the morning. No sz. Doing well. CURRENT ASM: LTG ER 200mg daily – started 3/3/21.\par \par \par SEIZURE DESCRIPTION AND TYPE:\par Type #1\par Severity: a form of aphasia\par Onset: 2/2012\par Quality & associated signs/symptoms: difficulty processing and making sense of written sentences, foggy\par Duration: 1-3 hours\par Timing: x4 total, last 10/22/20 \par Modifying factors: triggered by stress\par Diurnal Variation: none\par \par EPILEPSY TYPE: focal epilepsy\par HISTORY OF TONIC-CLONIC SZ: no\par HISTORY OF STATUS EPILEPTICUS: unclear if he had focal status\par \par SEIZURE RISK FACTORS:\par Father has something similar (confusion, focal impaired sounds like, has them since kid), on PB. Patient was a product of normal pregnancy and delivery. No history of febrile seizure, TBI, or CNS infection.\par \par PREVIOUS ASM:\par LEV – irritability \par \par IMAGING: \par MRI brain w/o 10/22/20 ([facility]): neg\par \par NEUROPHYSIOLOGY:\par EMU 3/2 – 3/3/21 ([facility]): 1) rare RT spikes/SW, 2) intermittent, independent BT slowing\par \par 48hr aEEG 2/2- 2/4/21 ([facility]): 1) one possible RT sharp; 2) intermittent slowing in the BT \par \par NEUROPSYCHOLOGY: \par none

## 2022-08-15 NOTE — ASSESSMENT
[FreeTextEntry1] : QUYNH SAMSON is a 65 year-old RH male with a history of migraine headache, anxiety, HLD, psoriasis who presents for followup for focal epilepsy characterized by focal sz with various levels of awareness. MRI unremarkable. EEG with RT spikes. Personally reviewed all images, labs and other studies. \par \par Doing well on LTG monotherapy. All questions and concerns answered and addressed in detail to patient's complete satisfaction. Patient verbalized understanding and agreed to plan.\par \par - continue LTG ER 200mg daily\par - f/u in 6 months\par \par \par All relevant epilepsy AAN quality care measures were addressed and discussed with the patient.\par \par More than 50% of time spent counseling and educating patient about epilepsy specific safety issues including AED side effects and interactions, alcohol consumption, sleep deprivation, risks and driving privileges associated with the [facility] [place] Guidelines, death related to seizures/SUDEP, seizure 1st aid and risks. Patient is educated on seizure precautions, including instruction not to do following after a breakthrough focal impaired sz - no driving, no operating machinery, no swimming or bathing, no climbing heights, or engage in any risky activities during which a seizure could cause further injury to pt or others.

## 2022-08-15 NOTE — HISTORY OF PRESENT ILLNESS
[FreeTextEntry1] : LAST OFFICE VISIT: 7/12/21\par \par PCP: Dr. Ladinsky\par \par INTERIM HISTORY:\par Enjoying senior living. Remains sz free.\par \par CURRENT ASM:\par LTG ER 200mg daily – started 3/3/21, level 4.2 on 7/14/21\par \par \par PRIOR HISTORY:\par 2/10/21: This is a 65 year-old RH male with a history of migraine headache, anxiety, HLD, psoriasis who is referred by ELLYN HUSSEIN,VERONICA SEPULVEDA for evaluation and management of seizure-like activity. \par \par First episode occurred Valentine's day on 2012. Pt was a . 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 He looked around the room, and couldn’t recall any of his colleague’s names. Felt disoriented, but still functional and able to carry out conversations. Went to [facility]. Saw Dr. Danielson. EEG at the time demonstrated focal slowing. He was started on LEV, which was eventually discontinued due to irritability.  \par \par Second episode 1-2 years later, again involving making sense of written words. He could see the words clearly, and could probably read out individual letters and even words, but he had trouble processed an entire sentence. Felt a little foggy in the head. Completed resolved in an hour. \par \par Third episode happened 7/2017, in the middle of his divorce. He was talking on the phone and suddenly got confused and had difficulty forming words and producing coherence speech. 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 Felt foggy. Presented to [facility] ER. CTH, CTA H/N, CTP, MRI brain were all neg.\par \par Underwent 48hr aEEG 2/2-2/4/21, which found one possible right temp discharge. 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 “Crinkled” vision has never overlapped with these episodes of difficulty processing written words. 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 Started on LTG titrating, and now on 50mg daily. Doing well. CURRENT AED: LTG ER 50mg daily (up-titration) – started 3/3/21.\par \par === 7/12/21 ===\par Insomnia with taking LTG ER in the evening, which was alleviated by taking it in the morning. No sz. Doing well. CURRENT ASM: LTG ER 200mg daily – started 3/3/21.\par \par \par SEIZURE DESCRIPTION AND TYPE:\par Type #1\par Severity: a form of aphasia\par Onset: 2/2012\par Quality & associated signs/symptoms: difficulty processing and making sense of written sentences, foggy\par Duration: 1-3 hours\par Timing: x4 total, last 10/22/20 \par Modifying factors: triggered by stress\par Diurnal Variation: none\par \par EPILEPSY TYPE: focal epilepsy\par HISTORY OF TONIC-CLONIC SZ: no\par HISTORY OF STATUS EPILEPTICUS: unclear if he had focal status\par \par SEIZURE RISK FACTORS:\par Father has something similar (confusion, focal impaired sounds like, has them since kid), on PB. Patient was a product of normal pregnancy and delivery. No history of febrile seizure, TBI, or CNS infection.\par \par PREVIOUS ASM:\par LEV – irritability \par \par IMAGING: \par MRI brain w/o 10/22/20 ([facility]): neg\par \par NEUROPHYSIOLOGY:\par EMU 3/2 – 3/3/21 ([facility]): 1) rare RT spikes/SW, 2) intermittent, independent BT slowing\par \par 48hr aEEG 2/2- 2/4/21 ([facility]): 1) one possible RT sharp; 2) intermittent slowing in the BT \par \par NEUROPSYCHOLOGY: \par none

## 2022-08-29 ENCOUNTER — RESULT REVIEW (OUTPATIENT)
Age: 67
End: 2022-08-29

## 2022-08-30 ENCOUNTER — APPOINTMENT (OUTPATIENT)
Dept: DERMATOLOGY | Facility: CLINIC | Age: 67
End: 2022-08-30

## 2022-08-30 PROCEDURE — 99214 OFFICE O/P EST MOD 30 MIN: CPT | Mod: 25

## 2022-08-30 PROCEDURE — 11102 TANGNTL BX SKIN SINGLE LES: CPT | Mod: 59

## 2022-08-30 PROCEDURE — 17110 DESTRUCTION B9 LES UP TO 14: CPT

## 2023-09-07 ENCOUNTER — EMERGENCY (EMERGENCY)
Facility: HOSPITAL | Age: 68
LOS: 1 days | Discharge: HOME | End: 2023-09-07
Attending: EMERGENCY MEDICINE
Payer: MEDICARE

## 2023-09-07 VITALS
HEIGHT: 70 IN | TEMPERATURE: 99 F | OXYGEN SATURATION: 97 % | SYSTOLIC BLOOD PRESSURE: 138 MMHG | HEART RATE: 82 BPM | RESPIRATION RATE: 18 BRPM | DIASTOLIC BLOOD PRESSURE: 78 MMHG | WEIGHT: 190.04 LBS

## 2023-09-07 DIAGNOSIS — Z98.890 OTHER SPECIFIED POSTPROCEDURAL STATES: Chronic | ICD-10-CM

## 2023-09-07 PROCEDURE — 99283 EMERGENCY DEPT VISIT LOW MDM: CPT

## 2023-09-07 PROCEDURE — 12004 RPR S/N/AX/GEN/TRK7.6-12.5CM: CPT

## 2023-09-07 PROCEDURE — 99284 EMERGENCY DEPT VISIT MOD MDM: CPT | Mod: FS,25

## 2023-09-07 RX ORDER — IBUPROFEN 200 MG
1 TABLET ORAL
Qty: 20 | Refills: 0
Start: 2023-09-07 | End: 2023-09-11

## 2023-09-07 NOTE — ED PROCEDURE NOTE - CPROC ED POST PROC CARE GUIDE1
Reviewed doctor's recommendations with pt, pt agreed with plan of care and will contact genetic specialist and have mammogram as ordered. Verbal/written post procedure instructions were given to patient/caregiver./Instructed patient/caregiver to follow-up with primary care physician./Instructed patient/caregiver regarding signs and symptoms of infection./Keep the cast/splint/dressing clean and dry.

## 2023-09-07 NOTE — ED PROVIDER NOTE - ADDITIONAL NOTES AND INSTRUCTIONS:
PT was evaluated At [facility] ED and was found to have a condition that warranted time of to rest and heal from WORK/SCHOOL.   Arnulfo Pack PA-C

## 2023-09-07 NOTE — ED PROVIDER NOTE - NSFOLLOWUPINSTRUCTIONS_ED_ALL_ED_FT
Patient education: Stitches and staples (The Basics)  Written by the doctors and editors at [facility]  Please read the Disclaimer at the end of this page.    What are stitches?  Stitches are a way doctors can close certain types of cuts. A doctor uses a special needle and thread to put in stitches. They sew the edges of the cut together and tie knots to hold the stitches in place (figure 1). The term doctors use for stitches is "sutures."    There are 2 main types of stitches:    ?Absorbable – These stitches dissolve over time. They do not need to be taken out.    ?Non-absorbable – These stitches need to be taken out after a certain amount of time. They do not dissolve.    In some cases, like if you have a very deep cut, your doctor might give you stitches plus something called "skin glue" or "tissue adhesive."    What are staples?  Another way doctors can close cuts is with staples. Staples that go in the body are different from those used on paper. To put staples in, doctors use a special stapler (figure 2). Staples need to be taken out after a certain amount of time, just like non-absorbable stitches.    How do I know if I need stitches or staples?  A doctor or nurse will have to look at your cut to decide. In general, you will need stitches or staples if your cut is wide, jagged, or goes deep enough through your skin. A cut will heal on its own without stitches or staples, but they help a cut heal faster and leave less of a scar.    Minor cuts and scrapes that do not go very deep usually do not need stitches. If you get a cut and don't know if you need stitches, check with your doctor or nurse.    What happens when I get stitches or staples?  Before the doctor stitches or staples your cut, they will clean out the cut well. They will also give you numbing medicine so that you don't feel pain when the stitches or staples go in.    After the doctor stitches or staples your cut, they will cover the area with gauze or a bandage.    Why is it important to take care of my stitches or staples?  It's important to take care of your stitches or staples so that your cut heals well and doesn't get infected.    How do I take care of my stitches or staples?  Your doctor or nurse will give you specific instructions, depending on the type of stitches you have and where they are. Staples need the same type of care as non-absorbable stitches.    Here is some general advice:    ?Keep your stitches or staples dry and covered with a bandage. Non-absorbable stitches and staples need to be kept dry for 1 to 2 days. Absorbable stitches sometimes need to be kept dry longer. Your doctor or nurse will tell you exactly how long to keep your stitches dry.    ?Once you no longer need to keep your stitches or staples dry, gently wash them with soap and water whenever you take a shower. Do not put your stitches or staples underwater, such as in a bath, pool, or lake. This can slow down healing and raise your chance of getting an infection.    ?After you wash your stitches or staples, pat them dry and put an antibiotic ointment on them    ?Cover your stitches or staples with a bandage or gauze, unless your doctor or nurse tells you not to    ?Avoid activities or sports that could hurt the area of your stitches or staples for 1 to 2 weeks. (Your doctor or nurse will tell you exactly how long to avoid these activities.) If you hurt the same part of your body again, stitches can break, and the cut can open up again.    When should I call the doctor or nurse?  Call your doctor or nurse if:    ?Your stitches break or the cut opens up again    ?You get a fever    ?You have redness or swelling around the cut, or pus drains from the cut. It is normal for clear yellow fluid to drain from the cut in the first few days.    When will my stitches or staples be taken out?  The doctor who puts in the stitches or staples will tell you when to see your doctor or nurse to have them taken out. Non-absorbable stitches usually stay in for 5 to 14 days, depending on where they are. Staples usually stay in for 7 to 10 days.    Staples need to be taken out with a special staple remover. But doctors' offices don't always have this device. The doctor who puts in your staples might give you a staple remover. If so, bring it to your doctor's office when you have your staples taken out.    What should I do after my stitches or staples are out?  After your stitches or staples are out, you should protect the scar from the sun. Use sunscreen on the area or wear clothes or a hat that covers the scar.    Your doctor or nurse might also recommend that you use certain lotions or creams to help your scar heal.
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
## 2023-09-07 NOTE — ED PROVIDER NOTE - PATIENT PORTAL LINK FT
You can access the FollowMyHealth Patient Portal offered by [facility] by registering at the following website: [email or web address]. By joining [email or web address]’s FollowMyHealth portal, you will also be able to view your health information using other applications (apps) compatible with our system.

## 2023-09-07 NOTE — ED PROVIDER NOTE - NS ED ROS FT
ROS: CONTUSIONAL: Denies fever, chills, fatigue, wt loss. HEAD: Denies trauma, HA, Dizziness. EYE: Denies Acute visual changes, diplopia. ENMT: Denies change in hearing, tinnitus, epistaxis, difficulty swallowing, sore throat. CARDIO: Denies CP, palpitations, edema. RESP: Denies Cough, SOB , Diff breathing, hemoptysis. GI: Denies N/V, ABD pain, change in bowel movement. URINARY: Denies difficulty urinating, pelvic pain. MS:  Denies joint pain, back pain, weakness, decreased ROM, swelling. NEURO: Denies change in gait, seizures, loss of sensation, dizziness, confusion LOC.  PSY: NO SI/HI. SKIN: +laceration  Denies Rash, bruising.

## 2023-09-07 NOTE — ED PROVIDER NOTE - NSICDXPASTSURGICALHX_GEN_ALL_CORE_FT
INITIAL PSYCHIATRIC HISTORY AND PHYSICAL      Patient name: Akil Driver  Admit date: 9/20/2022  Today's date: 9/21/2022           CC:  Schizoaffective disorder, bipolar type     HPI:   Patient seen in room on Adult Behavioral Unit. Patient is a 62 y.o. male who presents  to [facility] ED on 9/15. Per Internal Medicine notes: \"The patient is a 62 y.o. male with PMH below, presented to ED by way of police. Apparently they were summoned for a wellness check. Pt reportedly recently made suicidal statements to family members. They also had not seen him in 3 weeks. He was reportedly found by police so weak that he could not stand and he had to be carried out to the car. He was reportedly covered in stool and urine. He has been combative w/ staff and has required chemical and physical restraints. He is cooperative and not willing to participate in history. He has Hx of schizophrenia per staff. He has required admission for psychosis. \"    Pt now on [facility] unit [facility] after medically cleared to transfer. Pt was admitted and treated for RANDI, FTT, and hyponatremia. Pt was consulted by Dr. Nayana Hancock over the weekend. Pt gave limited information at that time. I attempted to speak with pt this morning, but he refused all conversation. Pt alley to tell me that he is sleeping and eating ok. Per nursing, pt has not eaten, is refusing all medications and care. I attempted to ask pt about his medications and why was not taking, he held up his hand to end my questions, shaking his head no. Pt is not willing to speak at this time. Pt appeared paranoid, guarded, but did not appear to be RTIS. Medications were continued from his inpatient stay, will continue to discuss medications and encourage eating and care.          Plan:  Restarted Haldol 10 mg QHS  Restarted Effexor 37.5 mg QD  Restarted Trazodone 150 mg QD     PAST PSYCHIATRIC HISTORY:  Schizoaffective disorder, bipolar type , MDD    FAMILY PSYCHIATRIC HISTORY:     Family History   Problem Relation Age of Onset    Heart Disease Mother     Mental Illness Mother     Cancer Father     Heart Disease Brother     Mental Illness Brother        ALLERGIES:  No Known Allergies    CURRENT MEDICATIONS:     phosphorus  500 mg Oral BID    venlafaxine  37.5 mg Oral Daily with breakfast    traZODone  150 mg Oral Nightly    haloperidol  10 mg Oral Nightly    nicotine  1 patch TransDERmal Daily       PAST MEDICAL HISTORY:    Past Medical History:   Diagnosis Date    Anxiety     Arthritis     Clostridium difficile diarrhea 01/10/2015    Depression     Difficulty urinating     DM (diabetes mellitus) ([facility]     Hematoma     pt unsure where it was    Hx of blood clots     pt unsure where    Hyperlipidemia     Hypertension     Pneumonia     Psychosis ([facility]     Schizoaffective disorder, bipolar type ([facility]         PAST SURGICAL HISTORY:    Past Surgical History:   Procedure Laterality Date    COLONOSCOPY      COLONOSCOPY  11/30/2016    colon polyp    TONSILLECTOMY         PROBLEM LIST:  Principal Problem:    Major depressive disorder, recurrent ([facility]  Resolved Problems:    * No resolved hospital problems.  *       SOCIAL HISTORY:  Social History     Socioeconomic History    Marital status:      Spouse name: Not on file    Number of children: 1    Years of education: 11    Highest education level: Not on file   Occupational History    Not on file   Tobacco Use    Smoking status: Some Days     Packs/day: 0.50     Years: 40.00     Pack years: 20.00     Types: Cigarettes    Smokeless tobacco: Never    Tobacco comments:     refused counseling; smokes less than a pack/day   Vaping Use    Vaping Use: Not on file   Substance and Sexual Activity    Alcohol use: Not Currently    Drug use: Yes     Types: Marijuana Lili Preston)    Sexual activity: Not Currently   Other Topics Concern    Not on file   Social History Narrative    Not on file     Social Determinants of Health     Financial Resource Strain: Not on file   Food Insecurity: Not on file   Transportation Needs: Not on file   Physical Activity: Not on file   Stress: Not on file   Social Connections: Not on file   Intimate Partner Violence: Not on file   Housing Stability: Not on file       OBJECTIVE:   Vitals:    09/20/22 1430   BP: 106/79   Pulse: 85   Resp: 18   Temp: 97.8 °F (36.6 °C)   SpO2: 95%       REVIEW OF SYSTEMS:   Reports no current cardiovascular, respiratory, gastrointestinal, genitourinary,   integumentary, neurological, musculoskeletal, or immunological symptoms today. PSYCHIATRIC:  See HPI above     PSYCHIATRIC EXAMINATION / MENTAL STATUS EXAM:     CONSTITUTIONAL:    Vitals:    Blood pressure 106/79, pulse 85, temperature 97.8 °F (36.6 °C), temperature source Temporal, resp. rate 18, height 5' 6\" (1.676 m), SpO2 95 %.   General appearance:  [x]  appears age, []  appears older than stated age,     []  adequately dressed and groomed, [x] disheveled,                []  in no acute distress, [x] appears mildly distressed,      []  Other:      MUSCULOSKELETAL:   Gait:   [x] normal, [] antalgic, [] unsteady, [] in bed, gait not evaluated   Station:  [x] erect, [] sitting, [] recumbent, [] other        Strength/tone:  [] muscle strength and tone appear consistent with age and condition     [x] atrophy      [] abnormal movements  PSYCHIATRIC:    Relatedness:  [] cooperative, [x] guarded, [] indifferent, [] hostile,      [] sedated  Speech:  [] normal prosody, [] pressured, [x] decreased volume,    [] slurred, [] halting, [] slowed, [] other     [] echolalia, [] incoherent, [] stuttering   Eye contact:  [] direct, [x] avoidant, [] intense  Kinetics:  [x] normal, [] increased, [] decreased  Mood:   [] stable, [] depressed, [] anxious, [x] irritable,     [] labile  Affect:   [] normal range, [] constricted, [] depressed, [x] anxious,     [] angry, [] blunted  Hallucinations  [x] denies, [] auditory,  [] visual,  [] olfactory, [] tactile  Delusions  [x] none, [] grandiose,  [] jealous,  [] persecutory,  [] somatic,     [] bizarre  Preoccupations  [x] none, [] violence, [] obsessions, [] other     Suicidal ideation  [x] denies, [] endorses  Homicidal ideation [x] denies, [] endorses  Thought process: [] logical, [] circumstantial, [] tangential, [] LOBO,     [x] simplistic, [] disorganized  Associations:  [] logical and coherent, [] loosening, [x] disorganized  Insight:   [] good, [] fair, [x] poor  Judgment:  [] good, [] fair, [x] poor  Attention and concentration:     [x] intact, [] limited, [] able to focus on interview,     [] grossly impaired  Orientation:  [x] person, place, time, situation     [] disoriented to:     Memory:  Remote memory [x] intact, [] impaired     Recent memory  [x] intact, [] impaired          IMPRESSION    Principal Problem:    Major depressive disorder, recurrent ([facility]  Resolved Problems:    * No resolved hospital problems. *       ______      Tx plan:  Prevent self injury, stabilize affect, restore sleep, treat depression, treat anxiety, establish/maintain aftercare, increase coping mechanisms, improve medication compliance. All conditions present on admission are being treated while pt is hospitalized. Discussed PHP after discharge as part of transition back to the community.      Medications  Current Facility-Administered Medications   Medication Dose Route Frequency Provider Last Rate Last Admin    phosphorus (K PHOS NEUTRAL) tablet 2 tablet  500 mg Oral BID MOIZ Rahman CNP        venlafaxine (EFFEXOR XR) extended release capsule 37.5 mg  37.5 mg Oral Daily with breakfast Fina Gaytan MD        traZODone (DESYREL) tablet 150 mg  150 mg Oral Nightly Fina Gaytan MD        haloperidol (HALDOL) tablet 10 mg  10 mg Oral Nightly Fina Gaytan MD        nicotine (NICODERM CQ) 14 MG/24HR 1 patch  1 patch TransDERmal Daily MOIZ Rahman - LC        polyethylene glycol (GLYCOLAX) packet 17 g  17 g Oral Daily PRN MOIZ Donald - LC        acetaminophen (TYLENOL) tablet 650 mg  650 mg Oral Q4H PRN Richy Morales MD        magnesium hydroxide (MILK OF MAGNESIA) 400 MG/5ML suspension 30 mL  30 mL Oral Daily PRN Richy Morales MD        nicotine polacrilex (COMMIT) lozenge 2 mg  2 mg Oral Q1H PRN Richy Morales MD        aluminum & magnesium hydroxide-simethicone (MAALOX) 200-200-20 MG/5ML suspension 30 mL  30 mL Oral Q6H PRN Richy Morales MD        hydrOXYzine HCl (ATARAX) tablet 50 mg  50 mg Oral TID PRN Richy Morales MD        OLANZapine (ZYPREXA) tablet 5 mg  5 mg Oral Q4H PRN Richy Morales MD        Or    OLANZapine (ZYPREXA) 5 mg in sterile water 1 mL injection  5 mg IntraMUSCular Q4H PRN Richy Morales MD        diphenhydrAMINE (BENADRYL) injection 50 mg  50 mg IntraMUSCular Q4H PRN Richy Morales MD        melatonin tablet 3 mg  3 mg Oral Nightly PRN Richy Morales MD          phosphorus  500 mg Oral BID    venlafaxine  37.5 mg Oral Daily with breakfast    traZODone  150 mg Oral Nightly    haloperidol  10 mg Oral Nightly    nicotine  1 patch TransDERmal Daily      PRN Meds: polyethylene glycol, acetaminophen, magnesium hydroxide, nicotine polacrilex, aluminum & magnesium hydroxide-simethicone, hydrOXYzine HCl, OLANZapine **OR** OLANZapine (ZyPREXA) in sterile water IntraMUSCular, diphenhydrAMINE, melatonin   Estimated length of stay: 2-5 days  Prognosis:  Poor   Criteria for Discharge:  Not suicidal, sleeping well, affect stable, depression improving, eating well, aftercare arranged. Spent > 70 minutes evaluating and treating patient, more than 50 % of that time was spent counseling the patient on their symptoms, treatment, and expected goals. ______  PLAN   1. Admit to Adult Behavioral Unit / Senior Behavioral Unit  2.   Consult Internal Medicine to evaluate and treat medical conditions  3. Adjust psychotropic medications to target symptoms  4. Occupational Therapy, Physical Therapy, Group Psychotherapy as tolerated   5. Reviewed treatment plan with patient including medication risks, benefits, side effects. Obtained informed consent for treatment.      MARGA Zafar-BC PAST SURGICAL HISTORY:  History of umbilical hernia repair
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
## 2023-09-07 NOTE — ED ADULT TRIAGE NOTE - CHIEF COMPLAINT QUOTE
right lower lateral leg laceration from possibly an anchor. right thigh laceration approx 5 cm, no bleeding at this time. unsure of tetanus status. right lateral thigh laceration approx 5 cm possibly form an anchor he fell on, no bleeding at this time. unsure of tetanus status.

## 2023-09-07 NOTE — ED PROCEDURE NOTE - CPROC ED ANATOMIC LOCATION1
Assessing Cancer Risk  Only about 5-10% of cancers are thought to be due to an inherited cancer susceptibility gene.    These families often have:    Several people with the same or related types of cancer    Cancers diagnosed at a young age (before age 50)    Individuals with more than one primary cancer    Multiple generations of the family affected with cancer    Some people may be candidates for genetic testing of more than one gene.  For these families, genetic testing using a cancer panel may be offered.  These panels can test many genes at once known to increase the risk for gynecologic (and other) cancers:  BRCA1, BRCA2, BRIP1 MLH1, MSH2, MSH6, PMS2, EPCAM, PTEN, PALB2, RAD51C, RAD51D, and TP53. The purpose of this handout is to serve as a brief summary of the gynecologic cancer risk genes that have published clinical management guidelines for individuals who are found to carry a mutation.    ______________________________________________________________________________  Hereditary Breast and Ovarian Cancer Syndrome   (BRCA1 and BRCA2)  A single mutation in one of the copies of BRCA1 or BRCA2 increases the risk for breast and ovarian cancer, among others.  The risk for pancreatic cancer and melanoma may also be slightly increased in some families.  The chart below shows the chance that someone with a BRCA mutation would develop cancer in his or her lifetime1,2,3,4.        A person s ethnic background is also important to consider, as individuals of Ashkenazi Baptist ancestry have a higher chance of having a BRCA gene mutation.  There are three BRCA mutations that occur more frequently in this population.    Odom Syndrome   (MLH1, MSH2, MSH6, PMS2, and EPCAM)  Currently five genes are known to cause Odom Syndrome: MLH1, MSH2, MSH6, PMS2, and EPCAM.  A single mutation in one of the Odom Syndrome genes increases the risk for colon, endometrial, ovarian, and stomach cancers.  Other cancers that occur  less commonly in Odom Syndrome include urinary tract, skin, and brain cancers.  The chart below shows the chance that a person with Odom syndrome would develop cancer in his or her lifetime7.      *Cancer risk varies depending on Odom syndrome gene found    Cowden Syndrome   (PTEN)  Cowden syndrome is a hereditary condition that increases the risk for breast, thyroid, endometrial, and kidney cancer.  Cowden syndrome is caused by a mutation in the PTEN gene.  A single mutation in one of the copies of PTEN causes Cowden syndrome and increases cancer risk.  The chart below shows the chance that someone with a PTEN mutation would develop cancer in their lifetime5,6.  Other benign features seen in some individuals with Cowden syndrome include benign skin lesions (facial papules, keratoses, lipomas), learning disability, autism, thyroid nodules, colon polyps, and larger head size.      *One recent study found breast cancer risk to be increased to 85%  Li-Fraumeni Syndrome   (TP53)  Li-Fraumeni Syndrome (LFS) is a cancer predisposition syndrome caused by a mutation in the TP53 gene. A single mutation in one of the copies of TP53 increases the risk for multiple cancers. Individuals with LFS are at an increased risk for developing cancer at a young age. The general lifetime risk for development of cancer is 50% by age 30 and 90% by age 60.     Core Cancers: Sarcomas, Breast, Brain, Lung, Leukemias/Lymphomas, Adrenocortical carcinomas  Other Cancers: Gastrointestinal, Thyroid, Skin, Genitourinary    Additional Genes  PALB2  Mutations in PALB2 have been shown to increase the risk of breast cancer up to 33-58% in some families; where individuals fall within this risk range is dependent upon family history. PALB2 mutations have also been associated with increased risk for pancreatic cancer, although this risk has not been quantified yet.  Individuals who inherit two PALB2 mutations--one from their mother and one from their  father--have a condition called Fanconi Anemia.  This rare autosomal recessive condition is associated with short stature, developmental delay, bone marrow failure, and increased risk for childhood cancers.    BRIP1, RAD51C and RAD51D  Mutations in BRIP1, RAD51C, and RAD51D have been shown to increase the risk of ovarian cancer as well as female breast cancer.    ______________________________________________________________________________    Inheritance  All of the cancer syndromes reviewed above are inherited in an autosomal dominant pattern.  This means that if a parent has a mutation, each of his or her children will have a 50% chance of inheriting that same mutation.  Therefore, each child--male or female--would have a 50% chance of being at increased risk for developing cancer.      Image obtained from Genetics Home Reference, 2013     Mutations in some genes can occur de bianca, which means that a person s mutation occurred for the first time in them and was not inherited from a parent.  Now that they have the mutation, however, it can be passed on to future generations.    Genetic Testing  Genetic testing involves a blood test and will look for any harmful mutations that are associated with increased cancer risk.  If possible, it is recommended that the person(s) who has had cancer be tested before other family members.  That person will give us the most useful information about whether or not a specific gene is associated with the cancer in the family.    Results  There are three possible results of genetic testing:    Positive--a harmful mutation was identified in one or more of the genes    Negative--no mutation was identified in any of the 9 genes on this panel    Variant of unknown significance--a variation in one of the genes was identified, but it is unclear how this impacts cancer risk in the family    Advantages and Disadvantages   There are advantages and disadvantages to genetic  testing.  Advantages    May clarify your cancer risk    Can help you make medical decisions    May explain the cancers in your family    May give useful information to your family members (if you share your results)    Disadvantages    Possible negative emotional impact of learning about inherited cancer risk    Uncertainty in interpreting a negative test result in some situations    Possible genetic discrimination concerns (see below)    Genetic Information Nondiscrimination Act (SHARAD)  SHARAD is a federal law that protects individuals from health insurance or employment discrimination based on a genetic test result alone.  Although rare, there are currently no legal discrimination protections in terms of life insurance, long term care, or disability insurances.  Visit the [employer] [place] website to learn more.    Reducing Cancer Risk  Each of the genes listed within this handout have nationally recognized cancer screening guidelines that would be recommended for individuals who test positive.  In addition to increased cancer screening, surgeries may be offered or recommended to reduce cancer risk.  Recommendations are based upon an individual s genetic test result as well as their personal and family history of cancer.    Questions to Think About Regarding Genetic Testing:    What effect will the test result have on me and my relationship with my family members if I have an inherited gene mutation?  If I don t have a gene mutation?    Should I share my test results, and how will my family react to this news, which may also affect them?    Are my children ready to learn new information that may one day affect their own health?    Hereditary Cancer Resources    FORCE: Facing Our Risk of Cancer Empowered facingourrisk.org   Bright Pink bebrightpink.org   Li-Fraumeni Syndrome Association lfsassociation.org   PTEN World PTENworld.com   Collaborative Group of the Americas on Inherited Colorectal  Cancer (CGA) cgaicc.com http://www.facingourrisk.org/   Cancer Care cancercare.org   American Cancer Society (ACS) cancer.org   National Cancer [place] (NCI) cancer.gov     Please call us if you have any questions or concerns.   Cancer Risk Management Program [phone]-CANCER ([phone])  ? Marleni Bosch, MS, [facility]  [phone]  ? Luci Lowe, MS, [facility]  [phone]  ? Bianca Farias, MS, [facility]  [phone]  ? Yanna Hess, MS, [facility]  [phone]  ? Erin Banks, MS, [facility] [phone]    References  1. Brittanie GABRIEL, Princess PDP, Heather S, Joselito PUGA, Yasmeen JE, Carmen JL, Mercedes N, Arianna H, Luann O, Shyla A, Donn B, Shubham P, Natalia S, Rao DM, Melchor N, Bruna E, Nyasia H, Cristóbal E, Shawnee J, Ganga J, Jennifer B, Tulinius H, Thorlacius S, Eerola H, Rosio H, Alex K, Santosh OP. Average risks of breast and ovarian cancer associated with BRCA1 or BRCA2 mutations detected in case series unselected for family history: a combined analysis of 222 studies. Am J Hum Ana. 2003;72:1117-30.  2. Kevin N, Ana Maria M, Barrett G.  BRCA1 and BRCA2 Hereditary Breast and Ovarian Cancer. Gene Reviews online. 2013.  3. Rufus YC, Francine S, Julia G, Murrieta S. Breast cancer risk among male BRCA1 and BRCA2 mutation carriers. J Natl Cancer Inst. 2007;99:1811-4.  4. Raymond ACEVEDO, Melany I, Noé J, Mary E, Alejandra ER, Champ F. Risk of breast cancer in male BRCA2 carriers. J Med Ana. 2010;47:710-1.  5. Marcus MH, Cheryl J, Huang J, Jose PARK, Larry MS, Eng C. Lifetime cancer risks in individuals with germline PTEN mutations. Clin Cancer Res. 2012;18:400-7.  6. Dinorah CONROY. Cowden Syndrome: A Critical Review of the Clinical Literature. J Ana . 2009:18:13-27.  7. National Comprehensive Cancer Network. Clinical practice guidelines in oncology, colorectal cancer screening. Available online (registration required). 2015.  8. Brittanie HULL et al. Breast-Cancer Risk in Families with Mutations in PALB2. NEJM.  2014; 371():497-506.         lateral aspect of lt thigh

## 2023-11-14 ENCOUNTER — APPOINTMENT (OUTPATIENT)
Dept: NEUROLOGY | Facility: CLINIC | Age: 68
End: 2023-11-14
Payer: MEDICARE

## 2023-11-14 VITALS
SYSTOLIC BLOOD PRESSURE: 133 MMHG | OXYGEN SATURATION: 93 % | BODY MASS INDEX: 28.63 KG/M2 | DIASTOLIC BLOOD PRESSURE: 71 MMHG | HEART RATE: 80 BPM | WEIGHT: 200 LBS | HEIGHT: 70 IN

## 2023-11-14 DIAGNOSIS — R41.3 OTHER AMNESIA: ICD-10-CM

## 2023-11-14 PROCEDURE — 99214 OFFICE O/P EST MOD 30 MIN: CPT

## 2023-11-14 RX ORDER — LAMOTRIGINE 200 MG/1
200 TABLET, EXTENDED RELEASE ORAL
Qty: 90 | Refills: 3 | Status: ACTIVE | COMMUNITY
Start: 2021-03-17 | End: 1900-01-01

## 2023-11-30 LAB
FOLATE SERPL-MCNC: >20 NG/ML
HCYS SERPL-MCNC: 9.9 UMOL/L
METHYLMALONATE SERPL-SCNC: 129 NMOL/L
TSH SERPL-ACNC: 1.23 UIU/ML
VIT B12 SERPL-MCNC: 594 PG/ML

## 2024-05-03 ENCOUNTER — APPOINTMENT (OUTPATIENT)
Dept: DERMATOLOGY | Facility: CLINIC | Age: 69
End: 2024-05-03
Payer: MEDICARE

## 2024-05-03 PROCEDURE — 17110 DESTRUCTION B9 LES UP TO 14: CPT

## 2024-05-03 PROCEDURE — 99214 OFFICE O/P EST MOD 30 MIN: CPT | Mod: 25

## 2024-08-12 ENCOUNTER — NON-APPOINTMENT (OUTPATIENT)
Age: 69
End: 2024-08-12

## 2024-08-15 ENCOUNTER — APPOINTMENT (OUTPATIENT)
Dept: ORTHOPEDIC SURGERY | Facility: CLINIC | Age: 69
End: 2024-08-15
Payer: MEDICARE

## 2024-08-15 VITALS
WEIGHT: 200 LBS | HEIGHT: 70 IN | SYSTOLIC BLOOD PRESSURE: 150 MMHG | HEART RATE: 82 BPM | BODY MASS INDEX: 28.63 KG/M2 | DIASTOLIC BLOOD PRESSURE: 70 MMHG

## 2024-08-15 DIAGNOSIS — M25.531 PAIN IN RIGHT WRIST: ICD-10-CM

## 2024-08-15 PROCEDURE — 99215 OFFICE O/P EST HI 40 MIN: CPT

## 2024-08-15 PROCEDURE — 73130 X-RAY EXAM OF HAND: CPT | Mod: 50

## 2024-08-15 PROCEDURE — 99205 OFFICE O/P NEW HI 60 MIN: CPT
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
## 2024-08-15 NOTE — PHYSICAL EXAM
[de-identified] : Examination of the [right] wrist reveals tenderness at the level of the distal radius with swelling and discoloration at the wrist  There is stiffness with digital motion otherwise there is an intact neurovascular examination. [de-identified] : [4] views of [bilateral hands and wrists] were obtained today in my office and were seen by me and discussed with the patient.  These concern for nondisplaced right distal radius fracture

## 2024-08-15 NOTE — PHYSICAL EXAM
[de-identified] : Examination of the [right] wrist reveals tenderness at the level of the distal radius with swelling and discoloration at the wrist  There is stiffness with digital motion otherwise there is an intact neurovascular examination. [de-identified] : [4] views of [bilateral hands and wrists] were obtained today in my office and were seen by me and discussed with the patient.  These concern for nondisplaced right distal radius fracture
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
## 2024-08-21 ENCOUNTER — APPOINTMENT (OUTPATIENT)
Dept: ORTHOPEDIC SURGERY | Facility: CLINIC | Age: 69
End: 2024-08-21
Payer: COMMERCIAL

## 2024-08-21 DIAGNOSIS — S62.109D FRACTURE OF UNSPECIFIED CARPAL BONE, UNSPECIFIED WRIST, SUBSEQUENT ENCOUNTER FOR FRACTURE WITH ROUTINE HEALING: ICD-10-CM

## 2024-08-21 DIAGNOSIS — M25.531 PAIN IN RIGHT WRIST: ICD-10-CM

## 2024-08-21 DIAGNOSIS — S52.509D UNSPECIFIED FRACTURE OF THE LOWER END OF UNSPECIFIED RADIUS, SUBSEQUENT ENCOUNTER FOR CLOSED FRACTURE WITH ROUTINE HEALING: ICD-10-CM

## 2024-08-21 PROCEDURE — 99214 OFFICE O/P EST MOD 30 MIN: CPT

## 2024-08-21 PROCEDURE — 73130 X-RAY EXAM OF HAND: CPT | Mod: 50

## 2024-08-21 PROCEDURE — 99204 OFFICE O/P NEW MOD 45 MIN: CPT

## 2024-08-21 NOTE — PHYSICAL EXAM
[de-identified] : Examination of the [right] wrist reveals tenderness at the level of the distal radius with swelling and discoloration at the wrist.  Tenderness at the triquetrum There is stiffness with digital motion otherwise there is an intact neurovascular examination. [de-identified] : [4] views of [bilateral hands and wrists] were obtained today in my office and were seen by me and discussed with the patient.  These concern for nondisplaced right distal radius fracture versus triquetral fracture

## 2024-08-21 NOTE — HISTORY OF PRESENT ILLNESS
[FreeTextEntry1] : Zach is a pleasant 68-year-old male who presents today after sustaining an injury to his right wrist on 8-13 subsequently seen in urgent care.  He has been bracing

## 2024-08-21 NOTE — ASSESSMENT
[FreeTextEntry1] : ASSESSMENT: The patient comes in today with acute onset of right wrist pain subsequent to injury on 8-13 unfortunately complicated by concerning wrist fracture findings.  We have discussed possibility of triquetral fracture.  At this point we have discussed treatment options.  He does elect for nonoperative care.  We have discussed proper immobilization nonweightbearing.  We will continue to follow with x-rays   The patient was adequately and thoroughly informed of my assessment of their current condition(s).  - This may diminish bodily function for the extremity. We discussed prognosis, tx modalities including operative and nonoperative options for the above diagnostic assessment. As always, 2nd opinion is always provided as an option.  When accessible, I was able to review other physicians note(s) including reviewing other tests, imaging results as well as personally view these results for my own interpretation.   The patient was adequately and thoroughly informed of my assessment of their current condition(s).  DISCUSSION: 1.  Right wrist concern for distal radius fracture versus triquetral fracture.  Immobilization .  Nonweightbearing 2.  Follow-up 3 week repeat x-rays of right wrist before traveling 3. [x]

## 2024-09-12 ENCOUNTER — APPOINTMENT (OUTPATIENT)
Dept: ORTHOPEDIC SURGERY | Facility: CLINIC | Age: 69
End: 2024-09-12

## 2024-11-22 ENCOUNTER — RX RENEWAL (OUTPATIENT)
Age: 69
End: 2024-11-22